# Patient Record
Sex: FEMALE | Race: WHITE | NOT HISPANIC OR LATINO | Employment: OTHER | URBAN - METROPOLITAN AREA
[De-identification: names, ages, dates, MRNs, and addresses within clinical notes are randomized per-mention and may not be internally consistent; named-entity substitution may affect disease eponyms.]

---

## 2017-01-04 ENCOUNTER — HOSPITAL ENCOUNTER (OUTPATIENT)
Dept: RADIOLOGY | Facility: HOSPITAL | Age: 72
Discharge: HOME | End: 2017-01-04
Attending: FAMILY MEDICINE
Payer: MEDICARE

## 2017-01-04 DIAGNOSIS — Z12.31 VISIT FOR SCREENING MAMMOGRAM: ICD-10-CM

## 2017-01-04 PROCEDURE — 77067 SCR MAMMO BI INCL CAD: CPT | Mod: TC

## 2017-01-04 PROCEDURE — 77067 SCR MAMMO BI INCL CAD: CPT | Mod: 26,,, | Performed by: RADIOLOGY

## 2017-01-04 PROCEDURE — 77063 BREAST TOMOSYNTHESIS BI: CPT | Mod: 26,,, | Performed by: RADIOLOGY

## 2018-05-11 DIAGNOSIS — Z12.31 VISIT FOR SCREENING MAMMOGRAM: Primary | ICD-10-CM

## 2018-05-18 ENCOUNTER — HOSPITAL ENCOUNTER (OUTPATIENT)
Dept: RADIOLOGY | Facility: HOSPITAL | Age: 73
Discharge: HOME | End: 2018-05-18
Attending: NURSE PRACTITIONER
Payer: MEDICARE

## 2018-05-18 DIAGNOSIS — Z12.31 VISIT FOR SCREENING MAMMOGRAM: ICD-10-CM

## 2018-05-18 PROCEDURE — 77067 SCR MAMMO BI INCL CAD: CPT | Mod: TC

## 2018-05-18 PROCEDURE — 77067 SCR MAMMO BI INCL CAD: CPT | Mod: 26,,, | Performed by: RADIOLOGY

## 2018-05-18 PROCEDURE — 77063 BREAST TOMOSYNTHESIS BI: CPT | Mod: 26,,, | Performed by: RADIOLOGY

## 2018-05-23 DIAGNOSIS — Z12.31 VISIT FOR SCREENING MAMMOGRAM: Primary | ICD-10-CM

## 2019-02-26 ENCOUNTER — HOSPITAL ENCOUNTER (OUTPATIENT)
Facility: HOSPITAL | Age: 74
Discharge: HOME | End: 2019-03-01
Attending: EMERGENCY MEDICINE | Admitting: HOSPITALIST
Payer: MEDICARE

## 2019-02-26 DIAGNOSIS — K92.2 GI BLEED: Primary | ICD-10-CM

## 2019-02-26 DIAGNOSIS — K62.5 BRBPR (BRIGHT RED BLOOD PER RECTUM): ICD-10-CM

## 2019-02-26 DIAGNOSIS — Z01.818 PREOP EXAMINATION: ICD-10-CM

## 2019-02-26 DIAGNOSIS — K92.2 GASTROINTESTINAL HEMORRHAGE, UNSPECIFIED GASTROINTESTINAL HEMORRHAGE TYPE: ICD-10-CM

## 2019-02-26 DIAGNOSIS — K31.89 DUODENAL MASS: ICD-10-CM

## 2019-02-26 DIAGNOSIS — R11.2 NAUSEA AND VOMITING, INTRACTABILITY OF VOMITING NOT SPECIFIED, UNSPECIFIED VOMITING TYPE: ICD-10-CM

## 2019-02-26 LAB
ABO + RH BLD: NORMAL
ALBUMIN SERPL BCP-MCNC: 3.5 G/DL
ALP SERPL-CCNC: 82 U/L
ALT SERPL W/O P-5'-P-CCNC: 22 U/L
ANION GAP SERPL CALC-SCNC: 10 MMOL/L
AST SERPL-CCNC: 20 U/L
BASOPHILS # BLD AUTO: 0.03 K/UL
BASOPHILS NFR BLD: 0.3 %
BILIRUB SERPL-MCNC: 0.6 MG/DL
BLD GP AB SCN CELLS X3 SERPL QL: NORMAL
BUN SERPL-MCNC: 32 MG/DL
CALCIUM SERPL-MCNC: 9.8 MG/DL
CHLORIDE SERPL-SCNC: 103 MMOL/L
CO2 SERPL-SCNC: 24 MMOL/L
CREAT SERPL-MCNC: 0.9 MG/DL
DIFFERENTIAL METHOD: ABNORMAL
EOSINOPHIL # BLD AUTO: 0.2 K/UL
EOSINOPHIL NFR BLD: 2.1 %
ERYTHROCYTE [DISTWIDTH] IN BLOOD BY AUTOMATED COUNT: 13.3 %
EST. GFR  (AFRICAN AMERICAN): >60 ML/MIN/1.73 M^2
EST. GFR  (NON AFRICAN AMERICAN): >60 ML/MIN/1.73 M^2
GLUCOSE SERPL-MCNC: 124 MG/DL
HCT VFR BLD AUTO: 39.3 %
HGB BLD-MCNC: 13.4 G/DL
INR PPP: 1
LIPASE SERPL-CCNC: 33 U/L
LYMPHOCYTES # BLD AUTO: 2.4 K/UL
LYMPHOCYTES NFR BLD: 21.5 %
MCH RBC QN AUTO: 31.6 PG
MCHC RBC AUTO-ENTMCNC: 34.1 G/DL
MCV RBC AUTO: 93 FL
MONOCYTES # BLD AUTO: 0.8 K/UL
MONOCYTES NFR BLD: 7.5 %
NEUTROPHILS # BLD AUTO: 7.5 K/UL
NEUTROPHILS NFR BLD: 68.6 %
OB PNL STL: POSITIVE
PLATELET # BLD AUTO: 202 K/UL
PMV BLD AUTO: 9.9 FL
POTASSIUM SERPL-SCNC: 4.2 MMOL/L
PROT SERPL-MCNC: 7.2 G/DL
PROTHROMBIN TIME: 10.7 SEC
RBC # BLD AUTO: 4.24 M/UL
SODIUM SERPL-SCNC: 137 MMOL/L
WBC # BLD AUTO: 11.01 K/UL

## 2019-02-26 PROCEDURE — 86901 BLOOD TYPING SEROLOGIC RH(D): CPT

## 2019-02-26 PROCEDURE — 63600175 PHARM REV CODE 636 W HCPCS: Performed by: EMERGENCY MEDICINE

## 2019-02-26 PROCEDURE — 85025 COMPLETE CBC W/AUTO DIFF WBC: CPT

## 2019-02-26 PROCEDURE — 82272 OCCULT BLD FECES 1-3 TESTS: CPT

## 2019-02-26 PROCEDURE — C9113 INJ PANTOPRAZOLE SODIUM, VIA: HCPCS | Performed by: EMERGENCY MEDICINE

## 2019-02-26 PROCEDURE — 96374 THER/PROPH/DIAG INJ IV PUSH: CPT

## 2019-02-26 PROCEDURE — 85610 PROTHROMBIN TIME: CPT

## 2019-02-26 PROCEDURE — 83690 ASSAY OF LIPASE: CPT

## 2019-02-26 PROCEDURE — 25000003 PHARM REV CODE 250: Performed by: EMERGENCY MEDICINE

## 2019-02-26 PROCEDURE — 80053 COMPREHEN METABOLIC PANEL: CPT

## 2019-02-26 PROCEDURE — 99291 CRITICAL CARE FIRST HOUR: CPT | Mod: 25

## 2019-02-26 RX ORDER — GABAPENTIN 100 MG/1
100 CAPSULE ORAL 3 TIMES DAILY
COMMUNITY

## 2019-02-26 RX ORDER — PANTOPRAZOLE SODIUM 40 MG/10ML
80 INJECTION, POWDER, LYOPHILIZED, FOR SOLUTION INTRAVENOUS
Status: COMPLETED | OUTPATIENT
Start: 2019-02-26 | End: 2019-02-26

## 2019-02-26 RX ORDER — CALCIUM CARBONATE 600 MG
600 TABLET ORAL ONCE
Status: ON HOLD | COMMUNITY
End: 2019-03-01 | Stop reason: HOSPADM

## 2019-02-26 RX ADMIN — SODIUM CHLORIDE 1000 ML: 0.9 INJECTION, SOLUTION INTRAVENOUS at 10:02

## 2019-02-26 RX ADMIN — PANTOPRAZOLE SODIUM 80 MG: 40 INJECTION, POWDER, FOR SOLUTION INTRAVENOUS at 10:02

## 2019-02-27 ENCOUNTER — ANESTHESIA EVENT (OUTPATIENT)
Dept: ENDOSCOPY | Facility: HOSPITAL | Age: 74
End: 2019-02-27
Payer: MEDICARE

## 2019-02-27 ENCOUNTER — ANESTHESIA (OUTPATIENT)
Dept: ENDOSCOPY | Facility: HOSPITAL | Age: 74
End: 2019-02-27
Payer: MEDICARE

## 2019-02-27 ENCOUNTER — CLINICAL SUPPORT (OUTPATIENT)
Dept: SMOKING CESSATION | Facility: CLINIC | Age: 74
End: 2019-02-27
Payer: COMMERCIAL

## 2019-02-27 DIAGNOSIS — F17.210 CIGARETTE SMOKER: Primary | ICD-10-CM

## 2019-02-27 PROBLEM — J44.9 COPD (CHRONIC OBSTRUCTIVE PULMONARY DISEASE): Status: ACTIVE | Noted: 2019-02-27

## 2019-02-27 PROBLEM — I10 HYPERTENSION: Status: ACTIVE | Noted: 2019-02-27

## 2019-02-27 PROBLEM — B19.20 HEPATITIS C: Status: ACTIVE | Noted: 2019-02-27

## 2019-02-27 PROBLEM — Z72.0 TOBACCO ABUSE: Status: ACTIVE | Noted: 2019-02-27

## 2019-02-27 PROBLEM — E78.5 HYPERLIPIDEMIA: Status: ACTIVE | Noted: 2019-02-27

## 2019-02-27 PROBLEM — K92.2 GI BLEED: Status: ACTIVE | Noted: 2019-02-27

## 2019-02-27 LAB
ANION GAP SERPL CALC-SCNC: 7 MMOL/L
BILIRUB UR QL STRIP: NEGATIVE
BUN SERPL-MCNC: 24 MG/DL
CALCIUM SERPL-MCNC: 8.7 MG/DL
CHLORIDE SERPL-SCNC: 107 MMOL/L
CLARITY UR: CLEAR
CO2 SERPL-SCNC: 24 MMOL/L
COLOR UR: YELLOW
CREAT SERPL-MCNC: 0.8 MG/DL
EST. GFR  (AFRICAN AMERICAN): >60 ML/MIN/1.73 M^2
EST. GFR  (NON AFRICAN AMERICAN): >60 ML/MIN/1.73 M^2
GLUCOSE SERPL-MCNC: 97 MG/DL
GLUCOSE UR QL STRIP: NEGATIVE
HCT VFR BLD AUTO: 33.1 %
HCT VFR BLD AUTO: 34.4 %
HGB BLD-MCNC: 11.6 G/DL
HGB BLD-MCNC: 11.6 G/DL
HGB UR QL STRIP: NEGATIVE
KETONES UR QL STRIP: NEGATIVE
LEUKOCYTE ESTERASE UR QL STRIP: NEGATIVE
NITRITE UR QL STRIP: NEGATIVE
PH UR STRIP: 6 [PH] (ref 5–8)
POTASSIUM SERPL-SCNC: 3.8 MMOL/L
PROT UR QL STRIP: NEGATIVE
SODIUM SERPL-SCNC: 138 MMOL/L
SP GR UR STRIP: 1.01 (ref 1–1.03)
URN SPEC COLLECT METH UR: NORMAL
UROBILINOGEN UR STRIP-ACNC: NEGATIVE EU/DL

## 2019-02-27 PROCEDURE — 25000003 PHARM REV CODE 250: Performed by: NURSE ANESTHETIST, CERTIFIED REGISTERED

## 2019-02-27 PROCEDURE — 87522 HEPATITIS C REVRS TRNSCRPJ: CPT

## 2019-02-27 PROCEDURE — 43235 PR EGD, FLEX, DIAGNOSTIC: ICD-10-PCS | Mod: ,,, | Performed by: INTERNAL MEDICINE

## 2019-02-27 PROCEDURE — 37000008 HC ANESTHESIA 1ST 15 MINUTES: Performed by: INTERNAL MEDICINE

## 2019-02-27 PROCEDURE — 85018 HEMOGLOBIN: CPT | Mod: 91

## 2019-02-27 PROCEDURE — 63600175 PHARM REV CODE 636 W HCPCS: Performed by: NURSE PRACTITIONER

## 2019-02-27 PROCEDURE — 94761 N-INVAS EAR/PLS OXIMETRY MLT: CPT

## 2019-02-27 PROCEDURE — 80048 BASIC METABOLIC PNL TOTAL CA: CPT

## 2019-02-27 PROCEDURE — 36415 COLL VENOUS BLD VENIPUNCTURE: CPT

## 2019-02-27 PROCEDURE — 99407 PR TOBACCO USE CESSATION INTENSIVE >10 MINUTES: ICD-10-PCS | Mod: S$GLB,,,

## 2019-02-27 PROCEDURE — 99215 PR OFFICE/OUTPT VISIT, EST, LEVL V, 40-54 MIN: ICD-10-PCS | Mod: ,,, | Performed by: INTERNAL MEDICINE

## 2019-02-27 PROCEDURE — 99215 OFFICE O/P EST HI 40 MIN: CPT | Mod: ,,, | Performed by: INTERNAL MEDICINE

## 2019-02-27 PROCEDURE — 43235 EGD DIAGNOSTIC BRUSH WASH: CPT | Mod: ,,, | Performed by: INTERNAL MEDICINE

## 2019-02-27 PROCEDURE — 85014 HEMATOCRIT: CPT | Mod: 91

## 2019-02-27 PROCEDURE — 43235 EGD DIAGNOSTIC BRUSH WASH: CPT | Performed by: INTERNAL MEDICINE

## 2019-02-27 PROCEDURE — 96376 TX/PRO/DX INJ SAME DRUG ADON: CPT | Mod: 59

## 2019-02-27 PROCEDURE — 37000009 HC ANESTHESIA EA ADD 15 MINS: Performed by: INTERNAL MEDICINE

## 2019-02-27 PROCEDURE — G0378 HOSPITAL OBSERVATION PER HR: HCPCS

## 2019-02-27 PROCEDURE — C9113 INJ PANTOPRAZOLE SODIUM, VIA: HCPCS | Performed by: NURSE PRACTITIONER

## 2019-02-27 PROCEDURE — 99407 BEHAV CHNG SMOKING > 10 MIN: CPT | Mod: S$GLB,,,

## 2019-02-27 PROCEDURE — 81003 URINALYSIS AUTO W/O SCOPE: CPT

## 2019-02-27 PROCEDURE — 63600175 PHARM REV CODE 636 W HCPCS: Performed by: NURSE ANESTHETIST, CERTIFIED REGISTERED

## 2019-02-27 RX ORDER — SODIUM CHLORIDE 0.9 % (FLUSH) 0.9 %
5 SYRINGE (ML) INJECTION
Status: DISCONTINUED | OUTPATIENT
Start: 2019-02-27 | End: 2019-03-01 | Stop reason: HOSPADM

## 2019-02-27 RX ORDER — ACETAMINOPHEN 325 MG/1
650 TABLET ORAL EVERY 4 HOURS PRN
Status: DISCONTINUED | OUTPATIENT
Start: 2019-02-27 | End: 2019-03-01 | Stop reason: HOSPADM

## 2019-02-27 RX ORDER — PROPOFOL 10 MG/ML
VIAL (ML) INTRAVENOUS
Status: DISCONTINUED | OUTPATIENT
Start: 2019-02-27 | End: 2019-02-27

## 2019-02-27 RX ORDER — ONDANSETRON 2 MG/ML
4 INJECTION INTRAMUSCULAR; INTRAVENOUS EVERY 8 HOURS PRN
Status: DISCONTINUED | OUTPATIENT
Start: 2019-02-27 | End: 2019-03-01 | Stop reason: HOSPADM

## 2019-02-27 RX ORDER — PANTOPRAZOLE SODIUM 40 MG/10ML
40 INJECTION, POWDER, LYOPHILIZED, FOR SOLUTION INTRAVENOUS 2 TIMES DAILY
Status: DISCONTINUED | OUTPATIENT
Start: 2019-02-27 | End: 2019-03-01 | Stop reason: HOSPADM

## 2019-02-27 RX ORDER — LIDOCAINE HCL/PF 100 MG/5ML
SYRINGE (ML) INTRAVENOUS
Status: DISCONTINUED | OUTPATIENT
Start: 2019-02-27 | End: 2019-02-27

## 2019-02-27 RX ORDER — SODIUM CHLORIDE 9 MG/ML
INJECTION, SOLUTION INTRAVENOUS CONTINUOUS PRN
Status: DISCONTINUED | OUTPATIENT
Start: 2019-02-27 | End: 2019-02-27

## 2019-02-27 RX ADMIN — PANTOPRAZOLE SODIUM 40 MG: 40 INJECTION, POWDER, LYOPHILIZED, FOR SOLUTION INTRAVENOUS at 10:02

## 2019-02-27 RX ADMIN — PROPOFOL 30 MG: 10 INJECTION, EMULSION INTRAVENOUS at 04:02

## 2019-02-27 RX ADMIN — LIDOCAINE HYDROCHLORIDE 100 MG: 20 INJECTION, SOLUTION INTRAVENOUS at 03:02

## 2019-02-27 RX ADMIN — PROPOFOL 50 MG: 10 INJECTION, EMULSION INTRAVENOUS at 03:02

## 2019-02-27 RX ADMIN — TOPICAL ANESTHETIC 1 EACH: 200 SPRAY DENTAL; PERIODONTAL at 03:02

## 2019-02-27 RX ADMIN — PROPOFOL 20 MG: 10 INJECTION, EMULSION INTRAVENOUS at 03:02

## 2019-02-27 RX ADMIN — SODIUM CHLORIDE: 9 INJECTION, SOLUTION INTRAVENOUS at 03:02

## 2019-02-27 RX ADMIN — PANTOPRAZOLE SODIUM 40 MG: 40 INJECTION, POWDER, LYOPHILIZED, FOR SOLUTION INTRAVENOUS at 08:02

## 2019-02-27 NOTE — HPI
This is a 73-year-old female with a history of chronic hepatitis-C, untreated, hypertension and hyperlipidemia presents complaining of dark hematochezia. She was in her usual state of health until yesterday when she noted several episodes, mild-to-moderate amount of dark red blood per rectum.  She denies any abdominal pain.  Some nausea. No hematemesis, dysphagia or odynophagia. She had a colonoscopy in 2016 which was unremarkable.  She does have a history of chronic hepatitis C, has not yet been treated.  She has never had an upper endoscopy that she is aware of.  She denies NSAID usage.  No other exacerbating or relieving factors or other associated symptoms.    The following portions of the patient's history were reviewed and updated as appropriate: allergies, current medications, past family history, past medical history, past social history, past surgical history and problem list.    (Portions of this note were dictated using voice recognition software and may contain dictation related errors in spelling/grammar/syntax not found on text review)

## 2019-02-27 NOTE — TRANSFER OF CARE
"Anesthesia Transfer of Care Note    Patient: Saima Dunn    Procedure(s) Performed: Procedure(s) (LRB):  ESOPHAGOGASTRODUODENOSCOPY (EGD) (N/A)    Patient location: GI    Anesthesia Type: MAC    Transport from OR: Transported from OR on room air with adequate spontaneous ventilation    Post pain: adequate analgesia    Post assessment: no apparent anesthetic complications and tolerated procedure well    Post vital signs: stable    Level of consciousness: sedated    Nausea/Vomiting: no nausea/vomiting    Complications: none    Transfer of care protocol was followed      Last vitals:   Visit Vitals  /67   Pulse 72   Temp 36.7 °C (98.1 °F)   Resp 18   Ht 4' 10" (1.473 m)   Wt 52 kg (114 lb 10.2 oz)   LMP 06/01/1966   SpO2 96%   Breastfeeding? No   BMI 23.96 kg/m²     "

## 2019-02-27 NOTE — PROGRESS NOTES
Individual Follow-Up Form    2/27/2019    Quit Date: To be determined    Clinical Status of Patient: Inpatient    Length of Service: 30 minutes    Comments: Smoking cessation education and materials provided.  Pt states that she is ready to quit smoking and she was enrolled in the [employer] during this visit.    Diagnosis: F17.210    Next Visit: Ambulatory referral to Smoking Cessation program following hospital discharge.

## 2019-02-27 NOTE — ED NOTES
SWATHI, ED TECH AT BEDSIDE PREPARING TO TRANSPORT PT TO FLOOR. RESPIRATIONS EVEN AND UNLABORED. NO ACUTE DISTRESS NOTED AT TIME OF TRANSPORT.

## 2019-02-27 NOTE — PROGRESS NOTES
[facility] Hospital Medicine  Progress Note    Patient Name: Saima Dunn  MRN: [id number]  Patient Class: OP- Observation   Admission Date: 2/26/2019  Length of Stay: 0 days  Attending Physician: Axel Cortez MD  Primary Care Provider: DALLAS Salvador        Subjective:     Principal Problem:GI bleed    HPI:  70 yo female with HTN, HLD, COPD, hepatitis C and tobacco abuse presented to ED with complaint of bright red blood per rectum followed by melena. Onset prior to arrival. The patient reports associated coffee ground emesis, generalized weakness and shortness of breath. She denies chest pain, palpitations and syncope. Labs in ED remarkable for H/H 13/39 , BUN 32. Occult blood positive. Pt hemodynamically stable. No active bleeding on exam. She reports last colonoscopy 1 year ago with no abnormalities, no recent upper GI studies. Patient admitted to  CDU for further evaluation. GI consulted.           Hospital Course:  Patient admitted for observation to the CDU. She was given protonix IV BID. Patient reported last bloody BM at 3 am with no additional episodes since. Hgb was trended and decreased to 11.6. GI was consulted and planned for EGD on 2/27/19.    Interval History: Pt examined at bedside. She reports one bloody BM around 3 am, no further episodes since. NAD or acute complaints. NPO for possible EGD today.    Review of Systems   Constitutional: Negative for chills, diaphoresis and fever.   HENT: Negative for congestion.    Respiratory: Negative for cough, chest tightness, shortness of breath and wheezing.    Cardiovascular: Negative for chest pain, palpitations and leg swelling.   Gastrointestinal: Positive for blood in stool. Negative for abdominal pain, diarrhea, nausea and vomiting.   Genitourinary: Negative for dysuria, flank pain, frequency and hematuria.   Musculoskeletal: Negative for back pain and myalgias.   Neurological: Positive for weakness. Negative  for dizziness, syncope, light-headedness and headaches.   Psychiatric/Behavioral: Negative for confusion.     Objective:     Vital Signs (Most Recent):  Temp: 97.9 °F (36.6 °C) (02/27/19 1706)  Pulse: 70 (02/27/19 1706)  Resp: 18 (02/27/19 1706)  BP: 137/62 (02/27/19 1706)  SpO2: 98 % (02/27/19 1706) Vital Signs (24h Range):  Temp:  [96.5 °F (35.8 °C)-98.3 °F (36.8 °C)] 97.9 °F (36.6 °C)  Pulse:  [] 70  Resp:  [16-24] 18  SpO2:  [94 %-100 %] 98 %  BP: ()/(51-68) 137/62     Weight: 52 kg (114 lb 10.2 oz)  Body mass index is 23.96 kg/m².    Intake/Output Summary (Last 24 hours) at 2/27/2019 1726  Last data filed at 2/27/2019 1608  Gross per 24 hour   Intake 100 ml   Output --   Net 100 ml      Physical Exam   Constitutional: She is oriented to person, place, and time. She appears well-developed and well-nourished.   Eyes: Conjunctivae are normal. Pupils are equal, round, and reactive to light.   Cardiovascular: Normal rate, regular rhythm, normal heart sounds and intact distal pulses.   Pulmonary/Chest: Effort normal. No respiratory distress. She has no wheezes.   Abdominal: Soft. Bowel sounds are normal. She exhibits no distension. There is no tenderness. There is no guarding.   Musculoskeletal: Normal range of motion. She exhibits no edema or tenderness.   Neurological: She is alert and oriented to person, place, and time. No cranial nerve deficit.   Skin: Skin is warm and dry. Capillary refill takes less than 2 seconds.   Psychiatric: She has a normal mood and affect. Her behavior is normal.   Nursing note and vitals reviewed.      Significant Labs:   CBC:   Recent Labs   Lab 02/26/19  2154 02/27/19  0502 02/27/19  0635   WBC 11.01  --   --    HGB 13.4 11.6* 11.6*   HCT 39.3 33.1* 34.4*     --   --      All pertinent labs within the past 24 hours have been reviewed.    Significant Imaging: I have reviewed all pertinent imaging results/findings within the past 24 hours.    Assessment/Plan:      *  GI bleed    -Patient reports last bloody BM at 3am  -NPO for possible EGD today, appreciate GI  -Hgb decreased to 11.6  -Continue to trend H&H  -Transfuse to keep hbg >7   -Continue PPI BID       Tobacco abuse    Encourage tobacco cessation        Hepatitis C    Appreciate GI, plan for outpatient treatment       Hyperlipidemia    Continue simvastatin 20 mg daily        Hypertension    SBP . Patient takes losartan at home    -Continue to hold for now       COPD (chronic obstructive pulmonary disease)    Chronic, stable   Prn nebs          VTE Risk Mitigation (From admission, onward)        Ordered     IP VTE HIGH RISK PATIENT  Once      02/27/19 0222     Place sequential compression device  Until discontinued      02/27/19 0222          Kelley Woodward PA-C  Department of Hospital Medicine   [facility]

## 2019-02-27 NOTE — PLAN OF CARE
02/27/19 1018   Discharge Assessment   Assessment Type Discharge Planning Assessment   Confirmed/corrected address and phone number on facesheet? Yes   Assessment information obtained from? Patient   Communicated expected length of stay with patient/caregiver yes   Prior to hospitilization cognitive status: Alert/Oriented   Prior to hospitalization functional status: Independent   Current cognitive status: Alert/Oriented   Current Functional Status: Independent   Lives With child(lisy), adult   Able to Return to Prior Arrangements yes   Is patient able to care for self after discharge? Yes   Patient currently being followed by outpatient case management? No   Patient currently receives any other outside agency services? No   Equipment Currently Used at Home none   Do you have any problems affording any of your prescribed medications? No   Is the patient taking medications as prescribed? yes   Does the patient have transportation home? Yes   Transportation Anticipated family or friend will provide   Does the patient receive services at the [facility]mad[facility] Clinic? No   Discharge Plan A Home   Discharge Plan B Home with family   DME Needed Upon Discharge  none   Patient/Family in Agreement with Plan yes       Discharge planning brochure provided. White board updated with CM name & contact info.  Pt encouraged to call with any questions or needs. CM will continue to follow patient throughout the transitions of care, and assist with any discharge needs.    Pt is [facility] pt and has already been in touch with PCP.  She will follow up in clinic tomorrow.    Dorcas Nunes RN    [phone]

## 2019-02-27 NOTE — SUBJECTIVE & OBJECTIVE
Past Medical History:   Diagnosis Date    Hepatitis C     Hyperlipidemia     Hypertension        Past Surgical History:   Procedure Laterality Date    COLONOSCOPY miralax split dose prep N/A 12/7/2016    [facility]formed by [facility]    HYSTERECTOMY  1967       Review of patient's allergies indicates:   Allergen Reactions    Codeine      Family History     Problem Relation (Age of Onset)    Ovarian cancer Mother, Sister        Tobacco Use    Smoking status: Current Every Day Smoker     Packs/day: 0.25    Tobacco comment: Enrolled in [employer]   Substance and Sexual Activity    Alcohol use: Not on file    Drug use: Not on file    Sexual activity: Not on file     Review of Systems  Objective:     Vital Signs (Most Recent):  Temp: 97.3 °F (36.3 °C) (02/27/19 0730)  Pulse: 69 (02/27/19 0822)  Resp: 18 (02/27/19 0730)  BP: 102/62 (02/27/19 0704)  SpO2: 95 % (02/27/19 0730) Vital Signs (24h Range):  Temp:  [96.5 °F (35.8 °C)-98.3 °F (36.8 °C)] 97.3 °F (36.3 °C)  Pulse:  [] 69  Resp:  [16-22] 18  SpO2:  [94 %-100 %] 95 %  BP: (102-144)/(52-68) 102/62     Weight: 52 kg (114 lb 10.2 oz) (02/27/19 0300)  Body mass index is 23.96 kg/m².    No intake or output data in the 24 hours ending 02/27/19 1122    Lines/Drains/Airways     Peripheral Intravenous Line                 Peripheral IV - Single Lumen 02/26/19 2152 Left Antecubital less than 1 day         Peripheral IV - Single Lumen 02/26/19 2235 Right Forearm less than 1 day                Physical Exam   Constitutional: She is oriented to person, place, and time. She appears well-developed and well-nourished. No distress.   HENT:   Head: Normocephalic and atraumatic.   Eyes: Conjunctivae are normal. No scleral icterus.   Neck: Normal range of motion. Neck supple. No tracheal deviation present. No thyromegaly present.   Cardiovascular: Normal rate and regular rhythm. Exam reveals no gallop and no friction rub.   No murmur  heard.  Pulmonary/Chest: Effort normal and breath sounds normal. No respiratory distress. She has no wheezes.   Abdominal: Soft. Bowel sounds are normal. She exhibits no distension. There is no tenderness.   Musculoskeletal:        Right wrist: She exhibits normal range of motion and no tenderness.        Left wrist: She exhibits normal range of motion and no tenderness.   Lymphadenopathy:        Head (right side): No submental and no submandibular adenopathy present.        Head (left side): No submental and no submandibular adenopathy present.   Neurological: She is alert and oriented to person, place, and time.   Skin: Skin is warm and dry. No rash noted. She is not diaphoretic. No erythema.   Psychiatric: She has a normal mood and affect. Her behavior is normal.   Nursing note and vitals reviewed.      Significant Labs:  CBC:   Recent Labs   Lab 02/26/19 2154 02/27/19  0502 02/27/19  0635   WBC 11.01  --   --    HGB 13.4 11.6* 11.6*   HCT 39.3 33.1* 34.4*     --   --      CMP:   Recent Labs   Lab 02/26/19 2154 02/27/19  0502   * 97   CALCIUM 9.8 8.7   ALBUMIN 3.5  --    PROT 7.2  --     138   K 4.2 3.8   CO2 24 24    107   BUN 32* 24*   CREATININE 0.9 0.8   ALKPHOS 82  --    ALT 22  --    AST 20  --    BILITOT 0.6  --        Significant Imaging:  Imaging results within the past 24 hours have been reviewed.

## 2019-02-27 NOTE — [employer] PATIENT INSTRUCTIONS
Discharge Summary/Instructions after an Endoscopic Procedure  Patient Name: Saima Dunn  Patient MRN: [id number]  Patient YOB: 1945  Wednesday, February 27, 2019  Magui Bradley MD  RESTRICTIONS:  During your procedure today, you received medications for sedation.  These   medications may affect your judgment, balance and coordination.  Therefore,   for 24 hours, you have the following restrictions:   - DO NOT drive a car, operate machinery, make legal/financial decisions,   sign important papers or drink alcohol.    ACTIVITY:  Today: no heavy lifting, straining or running due to procedural   sedation/anesthesia.  The following day: return to full activity including work.  DIET:  Eat and drink normally unless instructed otherwise.     TREATMENT FOR COMMON SIDE EFFECTS:  - Mild abdominal pain, nausea, belching, bloating or excessive gas:  rest,   eat lightly and use a heating pad.  - Sore Throat: treat with throat lozenges and/or gargle with warm salt   water.  - Because air was used during the procedure, expelling large amounts of air   from your rectum or belching is normal.  - If a bowel prep was taken, you may not have a bowel movement for 1-3 days.    This is normal.  SYMPTOMS TO WATCH FOR AND REPORT TO YOUR PHYSICIAN:  1. Abdominal pain or bloating, other than gas cramps.  2. Chest pain.  3. Back pain.  4. Signs of infection such as: chills or fever occurring within 24 hours   after the procedure.  5. Rectal bleeding, which would show as bright red, maroon, or black stools.   (A tablespoon of blood from the rectum is not serious, especially if   hemorrhoids are present.)  6. Vomiting.  7. Weakness or dizziness.  GO DIRECTLY TO THE NEAREST EMERGENCY ROOM IF YOU HAVE ANY OF THE FOLLOWING:      Difficulty breathing              Chills and/or fever over 101 F   Persistent vomiting and/or vomiting blood   Severe abdominal pain   Severe chest pain   Black, tarry stools   Bleeding- more than  one tablespoon   Any other symptom or condition that you feel may need urgent attention  Your doctor recommends these additional instructions:  If any biopsies were taken, your doctors clinic will contact you in 1 to 2   weeks with any results.  - [facility] to [facility].   - No NSAIDS.  - Clear liquid diet, do not advance.  NPO after MN.   - Continue present medications including bid IV PPI.  - EUS tomorrow to fully characterize this lesion.  For questions, problems or results please call your physician - Magui Bradley MD at Work:  [phone].  EMERGENCY PHONE NUMBER: [phone],  LAB RESULTS: [phone]  IF A COMPLICATION OR EMERGENCY SITUATION ARISES AND YOU ARE UNABLE TO REACH   YOUR PHYSICIAN - GO DIRECTLY TO THE EMERGENCY ROOM.  MD Magui Carty MD  2/27/2019 4:20:39 PM  This report has been verified and signed electronically.  [employer]

## 2019-02-27 NOTE — ANESTHESIA PREPROCEDURE EVALUATION
02/27/2019  Saima Dunn is a 73 y.o., female presents for EGD under MAC.    Past Medical History:   Diagnosis Date    Hepatitis C     Hyperlipidemia     Hypertension      Past Surgical History:   Procedure Laterality Date    COLONOSCOPY miralax split dose prep N/A 12/7/2016    Performed by Zhang Jorge Jr., MD at [facility] ENDO    HYSTERECTOMY  1967         Anesthesia Evaluation    I have reviewed the Patient Summary Reports.    I have reviewed the Nursing Notes.   I have reviewed the Medications.     Review of Systems  Anesthesia Hx:  No problems with previous Anesthesia    Cardiovascular:   Hypertension    Pulmonary:   COPD    Renal/:  Renal/ Normal     Hepatic/GI:   Liver Disease, Hepatitis    Neurological:  Neurology Normal    Endocrine:  Endocrine Normal        Physical Exam  General:  Well nourished    Airway/Jaw/Neck:  Airway Findings: Mouth Opening: Normal Tongue: Normal  General Airway Assessment: Adult  Mallampati: II  TM Distance: Normal, at least 6 cm       Chest/Lungs:  Chest/Lungs Findings: Clear to auscultation, Normal Respiratory Rate     Heart/Vascular:  Heart Findings: Rate: Normal  Rhythm: Regular Rhythm  Sounds: Normal     Abdomen:  Abdomen Findings:  Normal          Recent Labs   Lab 02/26/19 2154 02/27/19 0635   WBC 11.01  --   --    RBC 4.24  --   --    HGB 13.4   < > 11.6*   HCT 39.3   < > 34.4*     --   --    MCV 93  --   --    MCH 31.6*  --   --    MCHC 34.1  --   --     < > = values in this interval not displayed.       Chemistry        Component Value Date/Time     02/27/2019 0502    K 3.8 02/27/2019 0502     02/27/2019 0502    CO2 24 02/27/2019 0502    BUN 24 (H) 02/27/2019 0502    CREATININE 0.8 02/27/2019 0502    GLU 97 02/27/2019 0502        Component Value Date/Time    CALCIUM 8.7 02/27/2019 0502    ALKPHOS 82 02/26/2019 2154    AST 20  02/26/2019 2154    ALT 22 02/26/2019 2154    BILITOT 0.6 02/26/2019 2154    ESTGFRAFRICA >60 02/27/2019 0502    EGFRNONAA >60 02/27/2019 0502            Anesthesia Plan  Type of Anesthesia, risks & benefits discussed:  Anesthesia Type:  MAC  Patient's Preference:   Intra-op Monitoring Plan: standard ASA monitors  Intra-op Monitoring Plan Comments:   Post Op Pain Control Plan: per primary service following discharge from PACU  Post Op Pain Control Plan Comments:   Induction:   IV  Beta Blocker:         Informed Consent: Patient understands risks and agrees with Anesthesia plan.  Questions answered. Anesthesia consent signed with patient.  ASA Score: 3     Day of Surgery Review of History & Physical:  There are no significant changes.          Ready For Surgery From Anesthesia Perspective.

## 2019-02-27 NOTE — ASSESSMENT & PLAN NOTE
- given h/o hcv and prior negative colonoscopy, will plan egd  - continue npo  - PPI  - monitor hct

## 2019-02-27 NOTE — HOSPITAL COURSE
Patient was admitted for observation to the CDU. She was given protonix IV BID. Hgb was trended and remained stable above 11. 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 EUS was performed on 2/28/19 with biopsy taken of duodenal mass which resembled a possible lipoma on appearance. Patient reported a BM with BRB at 3 am on day of admission and a small BM with melena and minimal blood clots on morning of discharge. She remained asymptomatic and hemodynamically stable. Patient was discharged to home in good condition with pantoprazole 40 mg PO daily and GI follow-up in the next 2 weeks to discuss hcv treatment and follow-up pathologies.

## 2019-02-27 NOTE — CONSULTS
[facility]  Gastroenterology  Consult Note    Patient Name: Saima Dunn  MRN: [id number]  Admission Date: 2/26/2019  Hospital Length of Stay: 0 days  Code Status: Full Code   Attending Provider: Axel Cortez MD   Consulting Provider: Nilda Joshi MD  Primary Care Physician: DALLAS Salvador  Principal Problem:GI bleed    Inpatient consult to [facility]  Consult performed by: Nilda Joshi MD  Consult ordered by: Maria E Martinez NP  Reason for consult: GI Bleed        Subjective:     HPI:  This is a 73-year-old female with a history of chronic hepatitis-C, untreated, hypertension and hyperlipidemia presents complaining of dark hematochezia. She was in her usual state of health until yesterday when she noted several episodes, mild-to-moderate amount of dark red blood per rectum.  She denies any abdominal pain.  Some nausea. No hematemesis, dysphagia or odynophagia. She had a colonoscopy in 2016 which was unremarkable.  She does have a history of chronic hepatitis C, has not yet been treated.  She has never had an upper endoscopy that she is aware of.  She denies NSAID usage.  No other exacerbating or relieving factors or other associated symptoms.    The following portions of the patient's history were reviewed and updated as appropriate: allergies, current medications, past family history, past medical history, past social history, past surgical history and problem list.    (Portions of this note were dictated using voice recognition software and may contain dictation related errors in spelling/grammar/syntax not found on text review)    Past Medical History:   Diagnosis Date    Hepatitis C     Hyperlipidemia     Hypertension        Past Surgical History:   Procedure Laterality Date    COLONOSCOPY miralax split dose prep N/A 12/7/2016    Performed by [facility] Jr[facility] at [facility] ENDO    HYSTERECTOMY  1967       Review of patient's allergies  indicates:   Allergen Reactions    Codeine      Family History     Problem Relation (Age of Onset)    Ovarian cancer Mother, Sister        Tobacco Use    Smoking status: Current Every Day Smoker     Packs/day: 0.25    Tobacco comment: Enrolled in [employer]   Substance and Sexual Activity    Alcohol use: Not on file    Drug use: Not on file    Sexual activity: Not on file     Review of Systems  Objective:     Vital Signs (Most Recent):  Temp: 97.3 °F (36.3 °C) (02/27/19 0730)  Pulse: 69 (02/27/19 0822)  Resp: 18 (02/27/19 0730)  BP: 102/62 (02/27/19 0704)  SpO2: 95 % (02/27/19 0730) Vital Signs (24h Range):  Temp:  [96.5 °F (35.8 °C)-98.3 °F (36.8 °C)] 97.3 °F (36.3 °C)  Pulse:  [] 69  Resp:  [16-22] 18  SpO2:  [94 %-100 %] 95 %  BP: (102-144)/(52-68) 102/62     Weight: 52 kg (114 lb 10.2 oz) (02/27/19 0300)  Body mass index is 23.96 kg/m².    No intake or output data in the 24 hours ending 02/27/19 1122    Lines/Drains/Airways     Peripheral Intravenous Line                 Peripheral IV - Single Lumen 02/26/19 2152 Left Antecubital less than 1 day         Peripheral IV - Single Lumen 02/26/19 2235 Right Forearm less than 1 day                Physical Exam   Constitutional: She is oriented to person, place, and time. She appears well-developed and well-nourished. No distress.   HENT:   Head: Normocephalic and atraumatic.   Eyes: Conjunctivae are normal. No scleral icterus.   Neck: Normal range of motion. Neck supple. No tracheal deviation present. No thyromegaly present.   Cardiovascular: Normal rate and regular rhythm. Exam reveals no gallop and no friction rub.   No murmur heard.  Pulmonary/Chest: Effort normal and breath sounds normal. No respiratory distress. She has no wheezes.   Abdominal: Soft. Bowel sounds are normal. She exhibits no distension. There is no tenderness.   Musculoskeletal:        Right wrist: She exhibits normal range of motion and no tenderness.        Left wrist:  She exhibits normal range of motion and no tenderness.   Lymphadenopathy:        Head (right side): No submental and no submandibular adenopathy present.        Head (left side): No submental and no submandibular adenopathy present.   Neurological: She is alert and oriented to person, place, and time.   Skin: Skin is warm and dry. No rash noted. She is not diaphoretic. No erythema.   Psychiatric: She has a normal mood and affect. Her behavior is normal.   Nursing note and vitals reviewed.      Significant Labs:  CBC:   Recent Labs   Lab 02/26/19 2154 02/27/19  0502 02/27/19  0635   WBC 11.01  --   --    HGB 13.4 11.6* 11.6*   HCT 39.3 33.1* 34.4*     --   --      CMP:   Recent Labs   Lab 02/26/19 2154 02/27/19  0502   * 97   CALCIUM 9.8 8.7   ALBUMIN 3.5  --    PROT 7.2  --     138   K 4.2 3.8   CO2 24 24    107   BUN 32* 24*   CREATININE 0.9 0.8   ALKPHOS 82  --    ALT 22  --    AST 20  --    BILITOT 0.6  --        Significant Imaging:  Imaging results within the past 24 hours have been reviewed.    Assessment/Plan:     * GI bleed    - given h/o hcv and prior negative colonoscopy, will plan egd  - continue npo  - PPI  - monitor hct     Hepatitis C    - will send fibrosure, hcv genotype and pcr  - plan outpt treatment         Thank you for your consult. I will follow-up with patient. Please contact us if you have any additional questions.    Nilda Joshi MD  Gastroenterology  [facility]

## 2019-02-27 NOTE — PLAN OF CARE
02/27/19 1017   LEZAMA Message   Medicare Outpatient and Observation Notification regarding financial responsibility Given to patient/caregiver;Explained to patient/caregiver;Signed/date by patient/caregiver   Date LEZAMA was signed 02/27/19   Time LEZAMA was signed 1016

## 2019-02-27 NOTE — NURSING
Patient returned from Endoscopy on stretcher drinking a coke. AAOx4. No c/o pain nor discomfort noted. No co n/v. Ambulated to toilet with SBA. Ambulated to bed. Will cont to monitor.

## 2019-02-27 NOTE — H&P (VIEW-ONLY)
[facility]  Gastroenterology  Consult Note    Patient Name: Saima Dunn  MRN: [id number]  Admission Date: 2/26/2019  Hospital Length of Stay: 0 days  Code Status: Full Code   Attending Provider: Axel Cortez MD   Consulting Provider: Nilda Joshi MD  Primary Care Physician: DALLAS Salvador  Principal Problem:GI bleed    Inpatient consult [facility]  Consult performed by: Nilda Joshi MD  Consult ordered by: Maria E Martinez NP  Reason for consult: GI Bleed        Subjective:     HPI:  This is a 73-year-old female with a history of chronic hepatitis-C, untreated, hypertension and hyperlipidemia presents complaining of dark hematochezia. She was in her usual state of health until yesterday when she noted several episodes, mild-to-moderate amount of dark red blood per rectum.  She denies any abdominal pain.  Some nausea. No hematemesis, dysphagia or odynophagia. She had a colonoscopy in 2016 which was unremarkable.  She does have a history of chronic hepatitis C, has not yet been treated.  She has never had an upper endoscopy that she is aware of.  She denies NSAID usage.  No other exacerbating or relieving factors or other associated symptoms.    The following portions of the patient's history were reviewed and updated as appropriate: allergies, current medications, past family history, past medical history, past social history, past surgical history and problem list.    (Portions of this note were dictated using voice recognition software and may contain dictation related errors in spelling/grammar/syntax not found on text review)    Past Medical History:   Diagnosis Date    Hepatitis C     Hyperlipidemia     Hypertension        Past Surgical History:   Procedure Laterality Date    COLONOSCOPY miralax split dose prep N/A 12/7/2016    Performed by [facility] Jr., [facility] at [facility] ENDO    HYSTERECTOMY  1967       Review of patient's allergies  indicates:   Allergen Reactions    Codeine      Family History     Problem Relation (Age of Onset)    Ovarian cancer Mother, Sister        Tobacco Use    Smoking status: Current Every Day Smoker     Packs/day: 0.25    Tobacco comment: Enrolled in [employer]   Substance and Sexual Activity    Alcohol use: Not on file    Drug use: Not on file    Sexual activity: Not on file     Review of Systems  Objective:     Vital Signs (Most Recent):  Temp: 97.3 °F (36.3 °C) (02/27/19 0730)  Pulse: 69 (02/27/19 0822)  Resp: 18 (02/27/19 0730)  BP: 102/62 (02/27/19 0704)  SpO2: 95 % (02/27/19 0730) Vital Signs (24h Range):  Temp:  [96.5 °F (35.8 °C)-98.3 °F (36.8 °C)] 97.3 °F (36.3 °C)  Pulse:  [] 69  Resp:  [16-22] 18  SpO2:  [94 %-100 %] 95 %  BP: (102-144)/(52-68) 102/62     Weight: 52 kg (114 lb 10.2 oz) (02/27/19 0300)  Body mass index is 23.96 kg/m².    No intake or output data in the 24 hours ending 02/27/19 1122    Lines/Drains/Airways     Peripheral Intravenous Line                 Peripheral IV - Single Lumen 02/26/19 2152 Left Antecubital less than 1 day         Peripheral IV - Single Lumen 02/26/19 2235 Right Forearm less than 1 day                Physical Exam   Constitutional: She is oriented to person, place, and time. She appears well-developed and well-nourished. No distress.   HENT:   Head: Normocephalic and atraumatic.   Eyes: Conjunctivae are normal. No scleral icterus.   Neck: Normal range of motion. Neck supple. No tracheal deviation present. No thyromegaly present.   Cardiovascular: Normal rate and regular rhythm. Exam reveals no gallop and no friction rub.   No murmur heard.  Pulmonary/Chest: Effort normal and breath sounds normal. No respiratory distress. She has no wheezes.   Abdominal: Soft. Bowel sounds are normal. She exhibits no distension. There is no tenderness.   Musculoskeletal:        Right wrist: She exhibits normal range of motion and no tenderness.        Left wrist:  She exhibits normal range of motion and no tenderness.   Lymphadenopathy:        Head (right side): No submental and no submandibular adenopathy present.        Head (left side): No submental and no submandibular adenopathy present.   Neurological: She is alert and oriented to person, place, and time.   Skin: Skin is warm and dry. No rash noted. She is not diaphoretic. No erythema.   Psychiatric: She has a normal mood and affect. Her behavior is normal.   Nursing note and vitals reviewed.      Significant Labs:  CBC:   Recent Labs   Lab 02/26/19 2154 02/27/19  0502 02/27/19  0635   WBC 11.01  --   --    HGB 13.4 11.6* 11.6*   HCT 39.3 33.1* 34.4*     --   --      CMP:   Recent Labs   Lab 02/26/19 2154 02/27/19  0502   * 97   CALCIUM 9.8 8.7   ALBUMIN 3.5  --    PROT 7.2  --     138   K 4.2 3.8   CO2 24 24    107   BUN 32* 24*   CREATININE 0.9 0.8   ALKPHOS 82  --    ALT 22  --    AST 20  --    BILITOT 0.6  --        Significant Imaging:  Imaging results within the past 24 hours have been reviewed.    Assessment/Plan:     * GI bleed    - given h/o hcv and prior negative colonoscopy, will plan egd  - continue npo  - PPI  - monitor hct     Hepatitis C    - will send fibrosure, hcv genotype and pcr  - plan outpt treatment         Thank you for your consult. I will follow-up with patient. Please contact us if you have any additional questions.    Nilda Joshi MD  Gastroenterology  [facility]

## 2019-02-27 NOTE — PLAN OF CARE
Report called to lakshmi on 4th floor   vss no acute distress noted.   No complaints of pain or nausea

## 2019-02-27 NOTE — ASSESSMENT & PLAN NOTE
No active bleeding on exam   Trend H/H q6h   Type and screen, transfuse to keep Hbg >7   Consult GI   PPI BID

## 2019-02-27 NOTE — ANESTHESIA POSTPROCEDURE EVALUATION
"Anesthesia Post Evaluation    Patient: Saima Dunn    Procedure(s) Performed: Procedure(s) (LRB):  ESOPHAGOGASTRODUODENOSCOPY (EGD) (N/A)    Final Anesthesia Type: MAC  Patient location during evaluation: GI PACU  Patient participation: Yes- Able to Participate  Level of consciousness: awake and alert and oriented  Post-procedure vital signs: reviewed and stable  Pain management: adequate  Airway patency: patent  PONV status at discharge: No PONV  Anesthetic complications: no      Cardiovascular status: stable, hemodynamically stable and blood pressure returned to baseline  Respiratory status: room air, unassisted and spontaneous ventilation  Hydration status: euvolemic  Follow-up not needed.        Visit Vitals  /67   Pulse 72   Temp 36.7 °C (98.1 °F)   Resp 18   Ht 4' 10" (1.473 m)   Wt 52 kg (114 lb 10.2 oz)   LMP 06/01/1966   SpO2 96%   Breastfeeding? No   BMI 23.96 kg/m²       Pain/Anjum Score: No Data Recorded      "

## 2019-02-27 NOTE — ED PROVIDER NOTES
Encounter Date: 2/26/2019    SCRIBE #1 NOTE: I, Arnold Burks, am scribing for, and in the presence of,  Dr. Fonseca. I have scribed the entire note.       History     Chief Complaint   Patient presents with    Rectal Bleeding     73y F ambulatory to ED with c/o bloody stools and coffee ground emesis x1 day, c/o weakness     Saima Dunn is a 73 y.o. female who  has a past medical history of Hepatitis C, Hyperlipidemia, and Hypertension.    The patient presents to the ED due to rectal bleeding.   Patient reports symptoms started with one episode of coffee ground emesis this afternoon around 16:00. She then had an episode of dark, chocolate-colored bloody stool about 30 minutes afterward. Since that time she has had multiple episodes of eliana blood per rectum. She reports associated weakness, dizziness, and fatigue. She denies any associated abdominal pain, flank pain, or urinary complaints. She denies chest pain, SOB, fever, or any recent illness. She denies any prior similar episodes. She reports an unremarkable colonoscopy within the last few years.      The history is provided by the patient.     Review of patient's allergies indicates:   Allergen Reactions    Codeine      Past Medical History:   Diagnosis Date    Hepatitis C     Hyperlipidemia     Hypertension      Past Surgical History:   Procedure Laterality Date    COLONOSCOPY miralax split dose prep N/A 12/7/2016    Performed by [facility] Jr., MD at [facility] ENDO    HYSTERECTOMY  1967     Family History   Problem Relation Age of Onset    Ovarian cancer Mother     Ovarian cancer Sister      Social History     Tobacco Use    Smoking status: Current Every Day Smoker     Packs/day: 0.25   Substance Use Topics    Alcohol use: Not on file    Drug use: Not on file     Review of Systems   Constitutional: Negative for fever.   HENT: Negative for facial swelling and sore throat.    Eyes: Negative for pain and redness.   Respiratory: Negative  for shortness of breath.    Cardiovascular: Negative for chest pain.   Gastrointestinal: Positive for anal bleeding, blood in stool and vomiting. Negative for abdominal distention, abdominal pain, diarrhea and nausea.   Genitourinary: Negative for dysuria and hematuria.   Musculoskeletal: Negative for back pain and neck pain.   Skin: Negative for rash.   Neurological: Positive for weakness. Negative for seizures and facial asymmetry.       Physical Exam     Initial Vitals [02/26/19 2122]   BP Pulse Resp Temp SpO2   130/66 99 16 98.3 °F (36.8 °C) 99 %      MAP       --         Physical Exam    Nursing note and vitals reviewed.  Constitutional: She appears well-developed and well-nourished. She is not diaphoretic. No distress.   Well-appearing, NAD.   HENT:   Head: Normocephalic and atraumatic.   Mouth/Throat: Oropharynx is clear and moist.   Eyes: Conjunctivae and EOM are normal. Pupils are equal, round, and reactive to light.   Neck: Normal range of motion. Neck supple. No tracheal deviation present.   Cardiovascular: Normal rate, regular rhythm, normal heart sounds and intact distal pulses.   Pulmonary/Chest: Breath sounds normal. No stridor. No respiratory distress. She has no wheezes.   Abdominal: Soft. Bowel sounds are normal. She exhibits no distension and no mass. There is no tenderness. There is no rigidity, no rebound, no guarding, no CVA tenderness, no tenderness at McBurney's point and negative Rios's sign.   No significant tenderness.   Musculoskeletal: Normal range of motion. She exhibits no edema or tenderness.   Neurological: She is alert and oriented to person, place, and time. She has normal strength. No cranial nerve deficit or sensory deficit.   Skin: Skin is warm and dry. Capillary refill takes less than 2 seconds. No pallor.   Psychiatric: She has a normal mood and affect. Her behavior is normal. Thought content normal.         ED Course   Procedures  Labs Reviewed   CBC W/ AUTO DIFFERENTIAL -  Abnormal; Notable for the following components:       Result Value    MCH 31.6 (*)     All other components within normal limits   COMPREHENSIVE METABOLIC PANEL - Abnormal; Notable for the following components:    Glucose 124 (*)     BUN, Bld 32 (*)     All other components within normal limits   OCCULT BLOOD X 1, STOOL - Abnormal; Notable for the following components:    Occult Blood Positive (*)     All other components within normal limits   PROTIME-INR   LIPASE   TYPE & SCREEN          Imaging Results    None          Medical Decision Making:   History:   Old Medical Records: I decided to obtain old medical records.  Old Records Summarized: other records.       <> Summary of Records: Patient had colonscopy 12/2016 due to heme-positive stool. Patient has not followed up since that time.  Initial Assessment:   74 y/o female presents with coffee ground emesis and dark bloody stool starting today. Denies prior Hx.   Will obtain labs, stool guaiac, and anticipate admission for close monitoring for possible upper GI bleed.  Differential Diagnosis:   Differential Diagnosis includes, but is not limited to:  Lower GI bleed (AVM, colitis, colon cancer, polyp, internal/external hemorrhoid, IBS, rectal injury/foreign body, chronic diarrhea, anal fissure), upper GI bleed (PUD, perforated ulcer, esophageal variceal bleed), Crohns disease, ulcerative colitis, chronic diarrhea, dietary intake    ED Management:  Patient with multiple episodes of eliana blood per rectum, guaiac +.  Given Protonix in ED.  Labs unremarkable, H/H stable, no indication for emergent transfusion at this time.   However, given multiple recurrent large-volume episodes of rectal bleeding, will request admission for monitoring and further management.   Upon re-evaluation, the patient's status has remained stable.    At this time, I believe the patient should be admitted to the hospital for further evaluation and management of rectal bleeding.  [facility]  service was contacted and the case was discussed. Additional recommendations at this time: none    The consulting physician/team agrees with plan and will admit under their service.   The patient and family were updated with test results, overall impression, and further plan of care. All questions were answered. The patient expressed understanding and agrees with the current plan.                Attending Attestation:         Attending Critical Care:   Critical Care Times:   Direct Patient Care (initial evaluation, reassessments, and time considering the case)................................................................5 minutes.   Additional History from reviewing old medical records or taking additional history from the family, EMS, PCP, etc.......................5 minutes.   Ordering, Reviewing, and Interpreting Diagnostic Studies...............................................................................................................5 minutes.   Documentation..................................................................................................................................................................................5 minutes.   Consultation with other Physicians. .................................................................................................................................................5 minutes.   Consultation with the patient's family directly relating to the patient's condition, care, and DNR status (when patient unable)......5 minutes.   Other..................................................................................................................................................................................................5 minutes.   ==============================================================  · Total Critical Care Time - exclusive of procedural time: 35 minutes.  ==============================================================  Critical care was  necessary to treat or prevent imminent or life-threatening deterioration of the following conditions: GI bleeding.                  Clinical Impression:     1. BRBPR (bright red blood per rectum)    2. GI bleed    3. Nausea and vomiting, intractability of vomiting not specified, unspecified vomiting type              I, Dr. Garrison Fonseca, personally performed the services described in this documentation. All medical record entries made by the scribe were at my direction and in my presence.  I have reviewed the chart and agree that the record reflects my personal performance and is accurate and complete.     Garrison Fonseca MD.             Garrison Fonseca MD  02/27/19 0882

## 2019-02-27 NOTE — INTERVAL H&P NOTE
The patient has been examined and the H&P has been reviewed:    I concur with the findings and no changes have occurred since H&P was written.    Anesthesia/Surgery risks, benefits and alternative options discussed and understood by patient/family.          Active Hospital Problems    Diagnosis  POA    *GI bleed [K92.2]  Yes    COPD (chronic obstructive pulmonary disease) [J44.9]  Yes    Hypertension [I10]  Yes    Hyperlipidemia [E78.5]  Yes    Hepatitis C [B19.20]  Yes    Tobacco abuse [Z72.0]  Yes      Resolved Hospital Problems   No resolved problems to display.

## 2019-02-27 NOTE — PLAN OF CARE
[facility]. NPO since MN.  Patent IV access.  L AC and R AC. Procedure will be done later this afternoon.

## 2019-02-27 NOTE — HPI
70 yo female with HTN, HLD, COPD, hepatitis C and tobacco abuse presented to ED with complaint of bright red blood per rectum followed by melena. Onset prior to arrival. The patient reports associated coffee ground emesis, generalized weakness and shortness of breath. She denies chest pain, palpitations and syncope. Labs in ED remarkable for H/H 13/39 , BUN 32. Occult blood positive. Pt hemodynamically stable. No active bleeding on exam. She reports last colonoscopy 1 year ago with no abnormalities, no recent upper GI studies. Patient admitted to  CDU for further evaluation. GI consulted.

## 2019-02-27 NOTE — SUBJECTIVE & OBJECTIVE
Interval History: Pt examined at bedside. She reports one bloody BM around 3 am, no further episodes since. NAD or acute complaints. NPO for possible EGD today.    Review of Systems   Constitutional: Negative for chills, diaphoresis and fever.   HENT: Negative for congestion.    Respiratory: Negative for cough, chest tightness, shortness of breath and wheezing.    Cardiovascular: Negative for chest pain, palpitations and leg swelling.   Gastrointestinal: Positive for blood in stool. Negative for abdominal pain, diarrhea, nausea and vomiting.   Genitourinary: Negative for dysuria, flank pain, frequency and hematuria.   Musculoskeletal: Negative for back pain and myalgias.   Neurological: Positive for weakness. Negative for dizziness, syncope, light-headedness and headaches.   Psychiatric/Behavioral: Negative for confusion.     Objective:     Vital Signs (Most Recent):  Temp: 97.9 °F (36.6 °C) (02/27/19 1706)  Pulse: 70 (02/27/19 1706)  Resp: 18 (02/27/19 1706)  BP: 137/62 (02/27/19 1706)  SpO2: 98 % (02/27/19 1706) Vital Signs (24h Range):  Temp:  [96.5 °F (35.8 °C)-98.3 °F (36.8 °C)] 97.9 °F (36.6 °C)  Pulse:  [] 70  Resp:  [16-24] 18  SpO2:  [94 %-100 %] 98 %  BP: ()/(51-68) 137/62     Weight: 52 kg (114 lb 10.2 oz)  Body mass index is 23.96 kg/m².    Intake/Output Summary (Last 24 hours) at 2/27/2019 1726  Last data filed at 2/27/2019 1608  Gross per 24 hour   Intake 100 ml   Output --   Net 100 ml      Physical Exam   Constitutional: She is oriented to person, place, and time. She appears well-developed and well-nourished.   Eyes: Conjunctivae are normal. Pupils are equal, round, and reactive to light.   Cardiovascular: Normal rate, regular rhythm, normal heart sounds and intact distal pulses.   Pulmonary/Chest: Effort normal. No respiratory distress. She has no wheezes.   Abdominal: Soft. Bowel sounds are normal. She exhibits no distension. There is no tenderness. There is no guarding.    Musculoskeletal: Normal range of motion. She exhibits no edema or tenderness.   Neurological: She is alert and oriented to person, place, and time. No cranial nerve deficit.   Skin: Skin is warm and dry. Capillary refill takes less than 2 seconds.   Psychiatric: She has a normal mood and affect. Her behavior is normal.   Nursing note and vitals reviewed.      Significant Labs:   CBC:   Recent Labs   Lab 02/26/19  2154 02/27/19  0502 02/27/19  0635   WBC 11.01  --   --    HGB 13.4 11.6* 11.6*   HCT 39.3 33.1* 34.4*     --   --      All pertinent labs within the past 24 hours have been reviewed.    Significant Imaging: I have reviewed all pertinent imaging results/findings within the past 24 hours.

## 2019-02-27 NOTE — SUBJECTIVE & OBJECTIVE
Past Medical History:   Diagnosis Date    Hepatitis C     Hyperlipidemia     Hypertension        Past Surgical History:   Procedure Laterality Date    COLONOSCOPY miralax split dose prep N/A 12/7/2016    Performed by Zhang Jorge Jr., MD at [facility] ENDO    HYSTERECTOMY  1967       Review of patient's allergies indicates:   Allergen Reactions    Codeine        No current facility-administered medications on file prior to encounter.      Current Outpatient Medications on File Prior to Encounter   Medication Sig    BABY ASPIRIN ORAL Take 81 mg by mouth once daily.    calcium carbonate (OS-AMY) 500 mg calcium (1,250 mg) tablet Take 2 tablets by mouth once daily.     calcium carbonate (OS-AMY) 600 mg calcium (1,500 mg) Tab Take 600 mg by mouth once.    gabapentin (NEURONTIN) 100 MG capsule Take 100 mg by mouth 3 (three) times daily.    losartan (COZAAR) 50 MG tablet Take 100 mg by mouth once daily.     simvastatin (ZOCOR) 20 MG tablet Take 20 mg by mouth every other day.      Family History     Problem Relation (Age of Onset)    Ovarian cancer Mother, Sister        Tobacco Use    Smoking status: Current Every Day Smoker     Packs/day: 0.25   Substance and Sexual Activity    Alcohol use: Not on file    Drug use: Not on file    Sexual activity: Not on file     Review of Systems   Constitutional: Negative for chills, diaphoresis and fever.   HENT: Negative for congestion.    Eyes: Negative for photophobia.   Respiratory: Positive for shortness of breath. Negative for cough, chest tightness and wheezing.    Cardiovascular: Negative for chest pain, palpitations and leg swelling.   Gastrointestinal: Positive for blood in stool, nausea and vomiting. Negative for abdominal pain and diarrhea.   Genitourinary: Negative for dysuria, flank pain, frequency and hematuria.   Musculoskeletal: Negative for back pain and myalgias.   Neurological: Positive for weakness. Negative for dizziness, syncope, light-headedness  and headaches.   Psychiatric/Behavioral: Negative for confusion.     Objective:     Vital Signs (Most Recent):  Temp: 98 °F (36.7 °C) (02/27/19 0300)  Pulse: 75 (02/27/19 0400)  Resp: 20 (02/27/19 0300)  BP: 131/65 (02/27/19 0300)  SpO2: (!) 94 % (02/27/19 0300) Vital Signs (24h Range):  Temp:  [98 °F (36.7 °C)-98.3 °F (36.8 °C)] 98 °F (36.7 °C)  Pulse:  [] 75  Resp:  [16-22] 20  SpO2:  [94 %-100 %] 94 %  BP: (102-144)/(52-68) 131/65     Weight: 52 kg (114 lb 10.2 oz)  Body mass index is 23.96 kg/m².    Physical Exam   Constitutional: She is oriented to person, place, and time. She appears well-developed and well-nourished.   HENT:   Head: Normocephalic and atraumatic.   Eyes: Conjunctivae are normal. Pupils are equal, round, and reactive to light.   Neck: Normal range of motion. No JVD present.   Cardiovascular: Normal rate, regular rhythm, normal heart sounds and intact distal pulses.   Pulmonary/Chest: Effort normal. No respiratory distress. She has no wheezes.   Abdominal: Soft. Bowel sounds are normal. She exhibits no distension. There is no tenderness. There is no guarding.   Musculoskeletal: Normal range of motion. She exhibits no edema or tenderness.   Neurological: She is alert and oriented to person, place, and time. No cranial nerve deficit.   Skin: Skin is warm and dry. Capillary refill takes less than 2 seconds.   Psychiatric: She has a normal mood and affect. Her behavior is normal.         CRANIAL NERVES     CN III, IV, VI   Pupils are equal, round, and reactive to light.       Significant Labs:   BMP:   Recent Labs   Lab 02/26/19 2154   *      K 4.2      CO2 24   BUN 32*   CREATININE 0.9   CALCIUM 9.8     CBC:   Recent Labs   Lab 02/26/19 2154   WBC 11.01   HGB 13.4   HCT 39.3        Significant Imaging: I have reviewed all pertinent imaging results/findings within the past 24 hours.

## 2019-02-27 NOTE — ASSESSMENT & PLAN NOTE
-Patient reports last bloody BM at 3am  -NPO for possible EGD today, appreciate GI  -Hgb decreased to 11.6  -Continue to trend H&H  -Transfuse to keep hbg >7   -Continue PPI BID

## 2019-02-27 NOTE — MEDICAL/APP STUDENT
"[facility] Medicine  Progress Note     Patient Name: Saima Dunn  MRN: [id number]  Admission Date: 2/26/2019  Hospital Length of Stay: 0 days  Code Status: Full Code   Admitting Physician: Axel Cortez MD   Primary Care Physician: Thiago Romero MD  Principal Problem:GI bleed    Subjective:    HPI: Ms Dunn is a 74 YO smoker (quarter pk/dy) with a history of COPD, HCV (Dx'ed Jan 2017, suspected as a complication of her pregnancy prior to HCV screening of blood products), controlled HTN, and HLD who presented to [facility] ED the evening of 2/27 with onset at 1600 following a nap of 1 day of hematochezia associated with 1 episode of possible hematemesis described as "coffee with cream-colored" of large volume (described as several cups), generalized weakness, diaphoresis, and dyspnea. Denies any history of these symptoms. Denies any abdominal pain, diarrhea, appetite changes, chest pain, palpitations, or pre/syncope. Notes prior colonoscopy for rectal bleeding in 2018, which was unremarkable (said she was told it was likely 2/2 hemorrhoids), and yearly mammograms that were normal.     Hospital course:  2/26: Presented to [facility] ED and evaluated. H&H low but stable at 13/39 with BUN 32 without creatinine elevation, FOBT positive, normal PT/INR. O/W BMP and liver panel unremarkable. Patient hemodynamically stable with no active bleeding noted on initial H&P. Given 1L NSL. Admitted to CDU for further evaluation. Given single dose Protonix 80 mg IV. Counsoled regarding smoking cessation, to which patient agrees.    2/27: Now on 40 mg IV Protonix BID. Patient seen by Dr. Joshi of Gastro team, who has scheduled patient for EGD this afternoon. Patient denies any further hematemesis or melenic episodes. Repeat H&H 11.6/34 today.    Interval History: NAEON. No further hematemesis or melenic episodes noted. Appetite stable, no new complaints.    Past Medical History:   Diagnosis " Date    Hepatitis C     Hyperlipidemia     Hypertension    Patient also states history of Mitral Valve Prolapse, untreated    Past Surgical History:   Procedure Laterality Date    COLONOSCOPY miralax split dose prep N/A 12/7/2016    Performed by [facility] Jr[facility] at [facility] ENDO    HYSTERECTOMY  1967       Family History   Problem Relation Age of Onset    Ovarian cancer Mother     Ovarian cancer Sister        Social History     Socioeconomic History    Marital status:      Spouse name: None    Number of children: None    Years of education: None    Highest education level: None   Social Needs    Financial resource strain: None    Food insecurity - worry: None    Food insecurity - inability: None    Transportation needs - medical: None    Transportation needs - non-medical: None   Occupational History    None   Tobacco Use    Smoking status: Current Every Day Smoker     Packs/day: 0.25    Tobacco comment: Enrolled in [employer]   Substance and Sexual Activity    Alcohol use: None    Drug use: None    Sexual activity: None   Other Topics Concern    None   Social History Narrative    None       Current Facility-Administered Medications   Medication Dose Route Frequency Provider Last Rate Last Dose    acetaminophen tablet 650 mg  650 mg Oral Q4H PRN Maria E Martinez NP        ondansetron injection 4 mg  4 mg Intravenous Q8H PRN Maria E Martinez NP        pantoprazole injection 40 mg  40 mg Intravenous BID Maria E Martinez NP   40 mg at 02/27/19 1031    sodium chloride 0.9% flush 5 mL  5 mL Intravenous PRN Maria E Martinez NP           Review of patient's allergies indicates:   Allergen Reactions    Codeine      Review of Systems   Constitutional: Negative for chills, diaphoresis, fever and malaise/fatigue.   Respiratory: Negative for cough, hemoptysis, sputum production and wheezing.    Cardiovascular: Negative for chest pain, palpitations and leg  swelling.   Gastrointestinal: Positive for blood in stool. Negative for heartburn, nausea and vomiting.        Positive for Hematemesis   Genitourinary: Negative for dysuria and hematuria.   Musculoskeletal: Negative for myalgias and neck pain.   Skin: Negative for itching and rash.   Neurological: Positive for weakness. Negative for dizziness and loss of consciousness.       Objective:    Vitals:    02/27/19 1135 02/27/19 1155 02/27/19 1200 02/27/19 1416   BP:   (!) 109/59    BP Location:   Left arm    Patient Position:   Lying    Pulse: 66  71    Resp:   18    Temp:   98 °F (36.7 °C)    TempSrc:   Oral    SpO2:  96% 95% 96%   Weight:       Height:           Physical Exam:  Constitutional: Well-nourished, pleasant A&O X 3 elderly female, lying in bed, NAD  HENT:   Head: Normocephalic and atraumatic.   Eyes: EOM are normal. No scleral icterus. No epistaxis.  Cardiovascular: Normal rate, normal S1 and S2 with midsystolic click appreciated at mitral area accentuated when held in inspiration maneuver.  Radial pulses 2+ bilaterally Carotid pulses 2+ bilaterally, Doralis pedis pulses 2+ bilaterally, Posterior tibial pulses 2+ bilaterally   Capillary refill < 2s  Pulmonary/Chest: No respiratory distress. Expiratory wheezes appreciated throughout. No crackles   Abdominal: Soft, Nontender, with no guarding, rebound tenderness, or masses   Musculoskeletal: Normal range of motion.   Neurological: She is alert and oriented to person, place, and time.   Skin: Skin is warm and dry.     Recent Lab Results       02/27/19  0638   02/27/19  0635   02/27/19  0502   02/26/19  2212   02/26/19  2154        Albumin         3.5     Alkaline Phosphatase         82     ALT         22     Anion Gap     7   10     Appearance, UA Clear             AST         20     Baso #         0.03     Basophil%         0.3     Bilirubin (UA) Negative             Total Bilirubin         0.6  Comment:  For infants and newborns, interpretation of results  should be based  on gestational age, weight and in agreement with clinical  observations.  Premature Infant recommended reference ranges:  Up to 24 hours.............<8.0 mg/dL  Up to 48 hours............<12.0 mg/dL  3-5 days..................<15.0 mg/dL  6-29 days.................<15.0 mg/dL       BUN, Bld     24   32     Calcium     8.7   9.8     Chloride     107   103     CO2     24   24     Color, UA Yellow             Creatinine     0.8   0.9     Differential Method         Automated     eGFR if      >60   >60     eGFR if non      >60  Comment:  Calculation used to obtain the estimated glomerular filtration  rate (eGFR) is the CKD-EPI equation.      >60  Comment:  Calculation used to obtain the estimated glomerular filtration  rate (eGFR) is the CKD-EPI equation.        Eos #         0.2     Eosinophil%         2.1     Glucose     97   124     Glucose, UA Negative             Gran # (ANC)         7.5     Gran%         68.6     Group & Rh         O POS     Hematocrit   34.4 33.1   39.3     Hemoglobin   11.6 11.6   13.4     INDIRECT TORY         NEG     Coumadin Monitoring INR         1.0  Comment:  Coumadin Therapy:  2.0 - 3.0 for INR for all indicators except mechanical heart valves  and antiphospholipid syndromes which should use 2.5 - 3.5.       Ketones, UA Negative             Leukocytes, UA Negative             Lipase         33     Lymph #         2.4     Lymph%         21.5     MCH         31.6     MCHC         34.1     MCV         93     Mono #         0.8     Mono%         7.5     MPV         9.9     Nitrite, UA Negative             Occult Blood       Positive       Occult Blood UA Negative             pH, UA 6.0             Platelets         202     Potassium     3.8   4.2     Total Protein         7.2     Protein, UA Negative  Comment:  Recommend a 24 hour urine protein or a urine   protein/creatinine ratio if globulin induced proteinuria is  clinically suspected.                Protime         10.7     RBC         4.24     RDW         13.3     Sodium     138   137     Specific [place], UA 1.010             Specimen UA Urine, Clean Catch             Urobilinogen, UA Negative             WBC         11.01                        No imaging done.  EGD to be performed today     Assessment & Plan:    Principal problem: GI Bleed  - H&H reduced today at 11.6/34 from 13/39, BUN slightly reduced at 24 down from 32.  - Typed & Screened, transfusion indicated if HB < 7  - Received 1L NSL in ED, BP currently stable at 100s/60s, pulses stable at high 60s/low 70s  - No further active bleeding noted, no acute events overnight.  - EGD this afternoon per GI, appreciate their consult    Chronic Hepatitis C:  - Chronic, patient says her PCP told her most likely route of infection was during her pregnancies (has 4 kids) as she required blood products (patient is 73, blood not screened prior to 1992), patient denies any IVDU.  - Will be referred and treated as OPT for antiviral therapy  - I have asked if patient has been tested for HAV or HBV before, which patient denied, may consider testing/vaccinating as outpatient if appropriate with patient's PCP.    Hyperlipidemia:  - On simvastatin 20 mg daily: continue    HTN:  - -144  - On Losartan 50 mg daily at home. Holding for this admission in light of concerns for GI bleed as above    COPD:  - Patient consulted regarding smoking cessation, to which this patient agrees  - Stable and chronic  - Patient says she is not SOB, expiratory wheezes noted throughout my exam consistent with COPD history  - PRN DuoNeb if required.    VTE Risk Mitigation (From admission, onward)        Ordered     IP VTE HIGH RISK PATIENT  Once      02/27/19 0222     Place sequential compression device  Until discontinued      02/27/19 0222          Thank you for allowing our team to provide care for this patient    Evangelist Paredes MSY3 - [facility] Medicine - Dr. Cortez  Attending

## 2019-02-27 NOTE — PLAN OF CARE
Problem: Adult Inpatient Plan of Care  Goal: Plan of Care Review  Outcome: Ongoing (interventions implemented as appropriate)  Plan of care reviewed with patient. Patient verbalized complete understanding. Fall precautions maintained this shift. Bed in lowest position, locked, call light within reach, and slip resistant socks on. Nurse instructed patient to notify staff for any assistance and patient verbalized complete understanding. Patient on telemetry monitor throughout the shift with no ectopy noted. Will cont to monitor.

## 2019-02-27 NOTE — H&P
[facility] Medicine  History & Physical    Patient Name: Saima Dunn  MRN: [id number]  Admission Date: 2/26/2019  Attending Physician: Axel Cortez MD   Primary Care Provider: Thiago Romero MD         Patient information was obtained from patient, past medical records and ER records.     Subjective:     Principal Problem:GI bleed    Chief Complaint:   Chief Complaint   Patient presents with    Rectal Bleeding     73y F ambulatory to ED with c/o bloody stools and coffee ground emesis x1 day, c/o weakness        HPI: 70 yo female with HTN, HLD, COPD, hepatitis C and tobacco abuse presented to ED with complaint of bright red blood per rectum followed by melena. Onset prior to arrival. The patient reports associated coffee ground emesis, generalized weakness and shortness of breath. She denies chest pain, palpitations and syncope. Labs in ED remarkable for H/H 13/39 , BUN 32. Occult blood positive. Pt hemodynamically stable. No active bleeding on exam. She reports last colonoscopy 1 year ago with no abnormalities, no recent upper GI studies. Patient admitted to  CDU for further evaluation. GI consulted.           Past Medical History:   Diagnosis Date    Hepatitis C     Hyperlipidemia     Hypertension        Past Surgical History:   Procedure Laterality Date    COLONOSCOPY miralax split dose prep N/A 12/7/2016    Performed by Zhang Jorge Jr., MD at [facility] ENDO    HYSTERECTOMY  1967       Review of patient's allergies indicates:   Allergen Reactions    Codeine        No current facility-administered medications on file prior to encounter.      Current Outpatient Medications on File Prior to Encounter   Medication Sig    BABY ASPIRIN ORAL Take 81 mg by mouth once daily.    calcium carbonate (OS-AMY) 500 mg calcium (1,250 mg) tablet Take 2 tablets by mouth once daily.     calcium carbonate (OS-AMY) 600 mg calcium (1,500 mg) Tab Take 600 mg by mouth once.     gabapentin (NEURONTIN) 100 MG capsule Take 100 mg by mouth 3 (three) times daily.    losartan (COZAAR) 50 MG tablet Take 100 mg by mouth once daily.     simvastatin (ZOCOR) 20 MG tablet Take 20 mg by mouth every other day.      Family History     Problem Relation (Age of Onset)    Ovarian cancer Mother, Sister        Tobacco Use    Smoking status: Current Every Day Smoker     Packs/day: 0.25   Substance and Sexual Activity    Alcohol use: Not on file    Drug use: Not on file    Sexual activity: Not on file     Review of Systems   Constitutional: Negative for chills, diaphoresis and fever.   HENT: Negative for congestion.    Eyes: Negative for photophobia.   Respiratory: Positive for shortness of breath. Negative for cough, chest tightness and wheezing.    Cardiovascular: Negative for chest pain, palpitations and leg swelling.   Gastrointestinal: Positive for blood in stool, nausea and vomiting. Negative for abdominal pain and diarrhea.   Genitourinary: Negative for dysuria, flank pain, frequency and hematuria.   Musculoskeletal: Negative for back pain and myalgias.   Neurological: Positive for weakness. Negative for dizziness, syncope, light-headedness and headaches.   Psychiatric/Behavioral: Negative for confusion.     Objective:     Vital Signs (Most Recent):  Temp: 98 °F (36.7 °C) (02/27/19 0300)  Pulse: 75 (02/27/19 0400)  Resp: 20 (02/27/19 0300)  BP: 131/65 (02/27/19 0300)  SpO2: (!) 94 % (02/27/19 0300) Vital Signs (24h Range):  Temp:  [98 °F (36.7 °C)-98.3 °F (36.8 °C)] 98 °F (36.7 °C)  Pulse:  [] 75  Resp:  [16-22] 20  SpO2:  [94 %-100 %] 94 %  BP: (102-144)/(52-68) 131/65     Weight: 52 kg (114 lb 10.2 oz)  Body mass index is 23.96 kg/m².    Physical Exam   Constitutional: She is oriented to person, place, and time. She appears well-developed and well-nourished.   HENT:   Head: Normocephalic and atraumatic.   Eyes: Conjunctivae are normal. Pupils are equal, round, and reactive to light.    Neck: Normal range of motion. No JVD present.   Cardiovascular: Normal rate, regular rhythm, normal heart sounds and intact distal pulses.   Pulmonary/Chest: Effort normal. No respiratory distress. She has no wheezes.   Abdominal: Soft. Bowel sounds are normal. She exhibits no distension. There is no tenderness. There is no guarding.   Musculoskeletal: Normal range of motion. She exhibits no edema or tenderness.   Neurological: She is alert and oriented to person, place, and time. No cranial nerve deficit.   Skin: Skin is warm and dry. Capillary refill takes less than 2 seconds.   Psychiatric: She has a normal mood and affect. Her behavior is normal.         CRANIAL NERVES     CN III, IV, VI   Pupils are equal, round, and reactive to light.       Significant Labs:   BMP:   Recent Labs   Lab 02/26/19 2154   *      K 4.2      CO2 24   BUN 32*   CREATININE 0.9   CALCIUM 9.8     CBC:   Recent Labs   Lab 02/26/19 2154   WBC 11.01   HGB 13.4   HCT 39.3        Significant Imaging: I have reviewed all pertinent imaging results/findings within the past 24 hours.    Assessment/Plan:     * GI bleed    No active bleeding on exam   Trend H/H q6h   Type and screen, transfuse to keep Hbg >7   Consult GI   PPI BID        Tobacco abuse    Encourage tobacco cessation        Hepatitis C    Chronic        Hyperlipidemia    Taking simvastatin 20 mg  Daily        Hypertension    -144   Taking Losartan-hold for now        COPD (chronic obstructive pulmonary disease)    Chronic, stable   Prn nebs          VTE Risk Mitigation (From admission, onward)        Ordered     IP VTE HIGH RISK PATIENT  Once      02/27/19 0222     Place sequential compression device  Until discontinued      02/27/19 0222             Maria E Martinez NP  Department of Hospital Medicine   [facility]

## 2019-02-28 ENCOUNTER — ANESTHESIA (OUTPATIENT)
Dept: ENDOSCOPY | Facility: HOSPITAL | Age: 74
End: 2019-02-28
Payer: MEDICARE

## 2019-02-28 LAB
ANION GAP SERPL CALC-SCNC: 3 MMOL/L
BASOPHILS # BLD AUTO: 0.02 K/UL
BASOPHILS NFR BLD: 0.3 %
BUN SERPL-MCNC: 13 MG/DL
CALCIUM SERPL-MCNC: 9 MG/DL
CHLORIDE SERPL-SCNC: 107 MMOL/L
CO2 SERPL-SCNC: 27 MMOL/L
CREAT SERPL-MCNC: 0.8 MG/DL
DIFFERENTIAL METHOD: ABNORMAL
EOSINOPHIL # BLD AUTO: 0.2 K/UL
EOSINOPHIL NFR BLD: 3.4 %
ERYTHROCYTE [DISTWIDTH] IN BLOOD BY AUTOMATED COUNT: 12.9 %
EST. GFR  (AFRICAN AMERICAN): >60 ML/MIN/1.73 M^2
EST. GFR  (NON AFRICAN AMERICAN): >60 ML/MIN/1.73 M^2
GLUCOSE SERPL-MCNC: 104 MG/DL
HCT VFR BLD AUTO: 32.9 %
HGB BLD-MCNC: 11.5 G/DL
LYMPHOCYTES # BLD AUTO: 1.9 K/UL
LYMPHOCYTES NFR BLD: 28.6 %
MCH RBC QN AUTO: 32.1 PG
MCHC RBC AUTO-ENTMCNC: 35 G/DL
MCV RBC AUTO: 92 FL
MONOCYTES # BLD AUTO: 0.5 K/UL
MONOCYTES NFR BLD: 8.4 %
NEUTROPHILS # BLD AUTO: 3.8 K/UL
NEUTROPHILS NFR BLD: 59 %
PLATELET # BLD AUTO: 155 K/UL
PMV BLD AUTO: 9.6 FL
POTASSIUM SERPL-SCNC: 3.9 MMOL/L
RBC # BLD AUTO: 3.58 M/UL
SODIUM SERPL-SCNC: 137 MMOL/L
WBC # BLD AUTO: 6.46 K/UL

## 2019-02-28 PROCEDURE — 27202059 HC NEEDLE, FNA (ANY): Performed by: INTERNAL MEDICINE

## 2019-02-28 PROCEDURE — 99204 OFFICE O/P NEW MOD 45 MIN: CPT | Mod: ,,, | Performed by: STUDENT IN AN ORGANIZED HEALTH CARE EDUCATION/TRAINING PROGRAM

## 2019-02-28 PROCEDURE — 37000009 HC ANESTHESIA EA ADD 15 MINS: Performed by: INTERNAL MEDICINE

## 2019-02-28 PROCEDURE — 63600175 PHARM REV CODE 636 W HCPCS: Performed by: NURSE ANESTHETIST, CERTIFIED REGISTERED

## 2019-02-28 PROCEDURE — G0378 HOSPITAL OBSERVATION PER HR: HCPCS

## 2019-02-28 PROCEDURE — 80048 BASIC METABOLIC PNL TOTAL CA: CPT

## 2019-02-28 PROCEDURE — 37000008 HC ANESTHESIA 1ST 15 MINUTES: Performed by: INTERNAL MEDICINE

## 2019-02-28 PROCEDURE — 43239 EGD BIOPSY SINGLE/MULTIPLE: CPT | Performed by: INTERNAL MEDICINE

## 2019-02-28 PROCEDURE — 93010 ELECTROCARDIOGRAM REPORT: CPT | Mod: ,,, | Performed by: INTERNAL MEDICINE

## 2019-02-28 PROCEDURE — 94761 N-INVAS EAR/PLS OXIMETRY MLT: CPT

## 2019-02-28 PROCEDURE — 43242 PR UPGI ENDOSCOPY,FN NEEDLE BX,GUIDED: ICD-10-PCS | Mod: ,,, | Performed by: INTERNAL MEDICINE

## 2019-02-28 PROCEDURE — 93005 ELECTROCARDIOGRAM TRACING: CPT | Mod: 59

## 2019-02-28 PROCEDURE — 43239 PR EGD, FLEX, W/BIOPSY, SGL/MULTI: ICD-10-PCS | Mod: 59,,, | Performed by: INTERNAL MEDICINE

## 2019-02-28 PROCEDURE — 81596 NFCT DS CHRNC HCV 6 ASSAYS: CPT

## 2019-02-28 PROCEDURE — 87902 NFCT AGT GNTYP ALYS HEP C: CPT

## 2019-02-28 PROCEDURE — 88305 TISSUE EXAM BY PATHOLOGIST: CPT | Performed by: PATHOLOGY

## 2019-02-28 PROCEDURE — 93010 EKG 12-LEAD: ICD-10-PCS | Mod: ,,, | Performed by: INTERNAL MEDICINE

## 2019-02-28 PROCEDURE — 43239 EGD BIOPSY SINGLE/MULTIPLE: CPT | Mod: 59,,, | Performed by: INTERNAL MEDICINE

## 2019-02-28 PROCEDURE — 85025 COMPLETE CBC W/AUTO DIFF WBC: CPT

## 2019-02-28 PROCEDURE — 63600175 PHARM REV CODE 636 W HCPCS: Performed by: NURSE PRACTITIONER

## 2019-02-28 PROCEDURE — 25000003 PHARM REV CODE 250: Performed by: PHYSICIAN ASSISTANT

## 2019-02-28 PROCEDURE — 25000003 PHARM REV CODE 250: Performed by: NURSE ANESTHETIST, CERTIFIED REGISTERED

## 2019-02-28 PROCEDURE — C9113 INJ PANTOPRAZOLE SODIUM, VIA: HCPCS | Performed by: NURSE PRACTITIONER

## 2019-02-28 PROCEDURE — 87522 HEPATITIS C REVRS TRNSCRPJ: CPT

## 2019-02-28 PROCEDURE — 99204 PR OFFICE/OUTPT VISIT, NEW, LEVL IV, 45-59 MIN: ICD-10-PCS | Mod: ,,, | Performed by: STUDENT IN AN ORGANIZED HEALTH CARE EDUCATION/TRAINING PROGRAM

## 2019-02-28 PROCEDURE — 36415 COLL VENOUS BLD VENIPUNCTURE: CPT

## 2019-02-28 PROCEDURE — 43242 EGD US FINE NEEDLE BX/ASPIR: CPT | Performed by: INTERNAL MEDICINE

## 2019-02-28 PROCEDURE — 96376 TX/PRO/DX INJ SAME DRUG ADON: CPT

## 2019-02-28 PROCEDURE — 27201012 HC FORCEPS, HOT/COLD, DISP: Performed by: INTERNAL MEDICINE

## 2019-02-28 PROCEDURE — 88305 TISSUE EXAM BY PATHOLOGIST: CPT | Mod: 26,,, | Performed by: PATHOLOGY

## 2019-02-28 PROCEDURE — 88305 CYTOLOGY SPECIMEN-FNA NON-RADIOLOGY CLINICIAN PERFORMED W/O ON SITE: ICD-10-PCS | Mod: 26,,, | Performed by: PATHOLOGY

## 2019-02-28 PROCEDURE — 43242 EGD US FINE NEEDLE BX/ASPIR: CPT | Mod: ,,, | Performed by: INTERNAL MEDICINE

## 2019-02-28 RX ORDER — LIDOCAINE HCL/PF 100 MG/5ML
SYRINGE (ML) INTRAVENOUS
Status: DISCONTINUED | OUTPATIENT
Start: 2019-02-28 | End: 2019-02-28

## 2019-02-28 RX ORDER — SODIUM CHLORIDE 9 MG/ML
INJECTION, SOLUTION INTRAVENOUS CONTINUOUS PRN
Status: DISCONTINUED | OUTPATIENT
Start: 2019-02-28 | End: 2019-02-28

## 2019-02-28 RX ORDER — LOSARTAN POTASSIUM 50 MG/1
100 TABLET ORAL DAILY
Status: DISCONTINUED | OUTPATIENT
Start: 2019-02-28 | End: 2019-03-01 | Stop reason: HOSPADM

## 2019-02-28 RX ORDER — PROPOFOL 10 MG/ML
VIAL (ML) INTRAVENOUS CONTINUOUS PRN
Status: DISCONTINUED | OUTPATIENT
Start: 2019-02-28 | End: 2019-02-28

## 2019-02-28 RX ORDER — PROPOFOL 10 MG/ML
VIAL (ML) INTRAVENOUS
Status: DISCONTINUED | OUTPATIENT
Start: 2019-02-28 | End: 2019-02-28

## 2019-02-28 RX ORDER — PHENYLEPHRINE HYDROCHLORIDE 10 MG/ML
INJECTION INTRAVENOUS
Status: DISCONTINUED | OUTPATIENT
Start: 2019-02-28 | End: 2019-02-28

## 2019-02-28 RX ORDER — EPHEDRINE SULFATE 50 MG/ML
INJECTION, SOLUTION INTRAVENOUS
Status: DISCONTINUED | OUTPATIENT
Start: 2019-02-28 | End: 2019-02-28

## 2019-02-28 RX ADMIN — PHENYLEPHRINE HYDROCHLORIDE 200 MCG: 10 INJECTION INTRAVENOUS at 05:02

## 2019-02-28 RX ADMIN — EPHEDRINE SULFATE 10 MG: 50 INJECTION, SOLUTION INTRAMUSCULAR; INTRAVENOUS; SUBCUTANEOUS at 05:02

## 2019-02-28 RX ADMIN — PANTOPRAZOLE SODIUM 40 MG: 40 INJECTION, POWDER, LYOPHILIZED, FOR SOLUTION INTRAVENOUS at 08:02

## 2019-02-28 RX ADMIN — LIDOCAINE HYDROCHLORIDE 100 MG: 20 INJECTION, SOLUTION INTRAVENOUS at 04:02

## 2019-02-28 RX ADMIN — PROPOFOL 30 MG: 10 INJECTION, EMULSION INTRAVENOUS at 04:02

## 2019-02-28 RX ADMIN — PHENYLEPHRINE HYDROCHLORIDE 100 MCG: 10 INJECTION INTRAVENOUS at 05:02

## 2019-02-28 RX ADMIN — PROPOFOL 70 MG: 10 INJECTION, EMULSION INTRAVENOUS at 04:02

## 2019-02-28 RX ADMIN — PANTOPRAZOLE SODIUM 40 MG: 40 INJECTION, POWDER, LYOPHILIZED, FOR SOLUTION INTRAVENOUS at 09:02

## 2019-02-28 RX ADMIN — SODIUM CHLORIDE: 9 INJECTION, SOLUTION INTRAVENOUS at 04:02

## 2019-02-28 RX ADMIN — PROPOFOL 130 MCG/KG/MIN: 10 INJECTION, EMULSION INTRAVENOUS at 04:02

## 2019-02-28 RX ADMIN — LOSARTAN POTASSIUM 100 MG: 50 TABLET, FILM COATED ORAL at 01:02

## 2019-02-28 RX ADMIN — TOPICAL ANESTHETIC 1 EACH: 200 SPRAY DENTAL; PERIODONTAL at 04:02

## 2019-02-28 NOTE — CONSULTS
Patient ID: Saima Dunn is a 73 y.o. female.    Chief Complaint: Rectal Bleeding (73y F ambulatory to ED with c/o bloody stools and coffee ground emesis x1 day, c/o weakness)      HPI:  73F presented to the hospital with BRBPR two days ago. She was admitted to the medicine service and GI was consulted. She has never had this before. Denies any pain, dizziness. She had some nausea earlier but none today. GI performed an EGD yesterday which showed a large submucosal mass in the duodenum with central necrosis. Her HH is 11 down from 13 upon presentation. Today she feels well. No sign of bloody stool today or yesterday. She had a colonoscopy 2 years ago that was unremarkable. She does have COPD and occasionally uses an inhaler. Smoked 1ppd for 50 years, still smoking. Denies WELCH, CP.  Hemodynamically stable throughout, no blood transfusions.         Review of Systems   Constitutional: Negative for fever.   HENT: Negative for trouble swallowing.    Respiratory: Negative for shortness of breath.    Cardiovascular: Negative for chest pain.   Gastrointestinal: Positive for nausea and vomiting. Negative for abdominal pain and blood in stool.   Genitourinary: Negative for dysuria.   Musculoskeletal: Negative for gait problem.   Skin: Negative for rash and wound.   Allergic/Immunologic: Negative for immunocompromised state.   Neurological: Positive for weakness. Negative for light-headedness.   Hematological: Does not bruise/bleed easily.   Psychiatric/Behavioral: Negative for agitation.       Current Facility-Administered Medications   Medication Dose Route Frequency Provider Last Rate Last Dose    acetaminophen tablet 650 mg  650 mg Oral Q4H PRN Maria E Martinez NP        losartan tablet 100 mg  100 mg Oral Daily Kelley Woodward PA-C   100 mg at 02/28/19 1334    ondansetron injection 4 mg  4 mg Intravenous Q8H PRN Maria E Martinez NP        pantoprazole injection 40 mg  40 mg Intravenous BID Maria E  JENNIE Martinez NP   40 mg at 02/28/19 0902    sodium chloride 0.9% flush 5 mL  5 mL Intravenous PRN Maria E Martinez NP           Review of patient's allergies indicates:   Allergen Reactions    Codeine        Past Medical History:   Diagnosis Date    Hepatitis C     Hyperlipidemia     Hypertension        Past Surgical History:   Procedure Laterality Date    COLONOSCOPY miralax split dose prep N/A 12/7/2016    Performed by Zhang [facility] Jr., [facility]    ESOPHAGOGASTRODUODENOSCOPY (EGD) N/A 2/27/2019    Performed by Magui Bradley [facility] at [facility]    HYSTERECTOMY  1967   Lap damon at [facility] 5 years ago    Family History   Problem Relation Age of Onset    Ovarian cancer Mother     Ovarian cancer Sister        Social History     Socioeconomic History    Marital status:      Spouse name: Not on file    Number of children: Not on file    Years of education: Not on file    Highest education level: Not on file   Social Needs    Financial resource strain: Not on file    Food insecurity - worry: Not on file    Food insecurity - inability: Not on file    Transportation needs - medical: Not on file    Transportation needs - non-medical: Not on file   Occupational History    Not on file   Tobacco Use    Smoking status: Current Every Day Smoker     Packs/day: 0.25    Tobacco comment: Enrolled in [employer]   Substance and Sexual Activity    Alcohol use: Not on file    Drug use: Not on file    Sexual activity: Not on file   Other Topics Concern    Not on file   Social History Narrative    Not on file       Vitals:    02/28/19 1202   BP:    Pulse: 87   Resp: 18   Temp:        Physical Exam   Constitutional: She is oriented to person, place, and time. She appears well-developed and well-nourished. No distress.   HENT:   Head: Normocephalic and atraumatic.   Eyes: No scleral icterus.   Cardiovascular: Normal rate.   Pulmonary/Chest: Effort normal. No stridor.   Abdominal:  Soft. She exhibits no distension. There is no tenderness.   Lymphadenopathy:     She has no cervical adenopathy.   Neurological: She is alert and oriented to person, place, and time.   Skin: Skin is warm. No erythema.   Psychiatric: She has a normal mood and affect. Her behavior is normal.     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   Will need CT with contrast chest, ab, pelvis   HCV pending   Stable HD, no plans for urgent surgery  Continue PPI  Getting EUS today. Will follow

## 2019-02-28 NOTE — MEDICAL/APP STUDENT
"[facility] Medicine  Progress Note     Patient Name: Saima Dunn  MRN: [id number]  Admission Date: 2/26/2019  Hospital Length of Stay: 0 days  Code Status: Full Code   Admitting Physician: Axel Cortez MD   Primary Care Physician: Thiago Romero MD  Principal Problem:GI bleed    Subjective:     HPI: Ms Dunn is a 72 YO smoker (quarter pk/dy) with a history of COPD, HCV (Dx'ed Jan 2017, suspected as a complication of her pregnancy prior to HCV screening of blood products), controlled HTN, and HLD who presented to [facility] ED the evening of 2/27 with onset at 1600 following a nap of 1 day of hematochezia associated with 1 episode of possible hematemesis described as "coffee with cream-colored" of large volume (described as several cups), generalized weakness, diaphoresis, and dyspnea. Denies any history of these symptoms. Denies any abdominal pain, diarrhea, appetite changes, chest pain, palpitations, or pre/syncope. Notes prior colonoscopy for rectal bleeding in 2018, which was unremarkable (said she was told it was likely 2/2 hemorrhoids), and yearly mammograms that were normal.      Hospital course:  2/26: Presented to [facility] ED and evaluated. H&H low but stable at 13/39 with BUN 32 without creatinine elevation, FOBT positive, normal PT/INR. O/W BMP and liver panel unremarkable. Patient hemodynamically stable with no active bleeding noted on initial H&P. Given 1L NSL. Admitted to CDU for further evaluation. Given single dose Protonix 80 mg IV. Counsoled regarding smoking cessation, to which patient agrees.     2/27: Now on 40 mg IV Protonix BID. Patient denies any further hematemesis or melenic episodes. Repeat H&H 11.6/34 today. Patient seen by Dr. Joshi of Gastro team, who scheduled patient for EGD: revealed a large, pedunculated mass in the duodenal bulb with ulceration, consistent with patient's GI bleed presentation. GI plans for EUS for further lesion " characterization 2/28.    Interval history: NAEON. Patient denies any change in stools, hematemesis, or further melanic episodes. Patient says she has chronic arthritis, worse in her SI joints, unchanged from before, so she prefers to sleep on her side with her legs tucked up towards her chest. Says she wants to walk around a bit today before EUS with her family, which alleviates her pain.    Past Medical History:   Diagnosis Date    Hepatitis C     Hyperlipidemia     Hypertension        Past Surgical History:   Procedure Laterality Date    COLONOSCOPY miralax split dose prep N/A 12/7/2016    [facility]    ESOPHAGOGASTRODUODENOSCOPY (EGD) N/A 2/27/2019    [facility]    HYSTERECTOMY  1967       Family History   Problem Relation Age of Onset    Ovarian cancer Mother     Ovarian cancer Sister        Social History     Socioeconomic History    Marital status:      Spouse name: None    Number of children: None    Years of education: None    Highest education level: None   Social Needs    Financial resource strain: None    Food insecurity - worry: None    Food insecurity - inability: None    Transportation needs - medical: None    Transportation needs - non-medical: None   Occupational History    None   Tobacco Use    Smoking status: Current Every Day Smoker     Packs/day: 0.25    Tobacco comment: Enrolled in [employer]ess[employer]   Substance and Sexual Activity    Alcohol use: None    Drug use: None    Sexual activity: None   Other Topics Concern    None   Social History Narrative    None       Current Facility-Administered Medications   Medication Dose Route Frequency Provider Last Rate Last Dose    acetaminophen tablet 650 mg  650 mg Oral Q4H PRN Maira E Martinez NP        ondansetron injection 4 mg  4 mg Intravenous Q8H PRN Maria E Martinez NP        pantoprazole injection 40 mg  40 mg  Intravenous BID Maria E Martinez NP   40 mg at 02/28/19 0902    sodium chloride 0.9% flush 5 mL  5 mL Intravenous PRN Maria E Martinez NP           Review of patient's allergies indicates:   Allergen Reactions    Codeine      Review of Systems   Constitutional: Negative for diaphoresis and malaise/fatigue.   Respiratory: Negative for cough, hemoptysis and shortness of breath.    Cardiovascular: Negative for chest pain and palpitations.   Gastrointestinal: Negative for abdominal pain, blood in stool, diarrhea, melena, nausea and vomiting.   Genitourinary: Negative for dysuria and urgency.   Neurological: Negative for dizziness and headaches.     Objective:    Vitals:    02/28/19 0500 02/28/19 0644 02/28/19 0730 02/28/19 0738   BP: (!) 108/58 (!) 116/58 (!) 116/58    BP Location: Left arm  Left arm    Patient Position: Lying Lying Lying    Pulse: 67 71 71 72   Resp: 16 20 20    Temp: 97.6 °F (36.4 °C) 98.4 °F (36.9 °C) 98.4 °F (36.9 °C)    TempSrc: Oral Oral Oral    SpO2:  95% 95%    Weight:       Height:         Physical Exam   Constitutional: She is oriented to person, place, and time. She appears well-developed and well-nourished. No distress.   Eyes: Conjunctivae and EOM are normal. No scleral icterus.   Cardiovascular: Normal rate and regular rhythm.   Normal S1 and S2, with midcystolic click appreciated as yesterday, unchanged from past exam.   Pulmonary/Chest: Effort normal. No respiratory distress.   Coarse expiratory wheezes throughout, unchanged from my past exam   Abdominal: Soft. Bowel sounds are normal. She exhibits no distension. There is no tenderness. There is no rebound and no guarding.   Neurological: She is alert and oriented to person, place, and time.   Skin: Skin is warm and dry.     Recent Lab Results       02/28/19  0738        Anion Gap 3     BUN, Bld 13     Calcium 9.0     Chloride 107     CO2 27     Creatinine 0.8     eGFR if  >60     eGFR if non   >60  Comment:  Calculation used to obtain the estimated glomerular filtration  rate (eGFR) is the CKD-EPI equation.        Glucose 104     Potassium 3.9     Sodium 137         Pertinent Imaging    No radiology to report.    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 There is ulceration in the surface of the lesion with a flat red spot.This is the likely source for bleeding.    EUS to be performed this afternoon    Assessment & Plan:    Principal Problem: GI bleed  - No signs of bleeding on my exam, patient reports no hematemesis/melena/hematochezia overnight following EGD. No change in appetite (held NPO in anticipation of EUS today).  - No CBC values today, but BUN is normalized. BMP unremarkable. BP/pulse stable, no fluid requirements.  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 They did not resect the mass, did not collect specimens as they wish to further characterize EUS. No other significant findings.  - Given the nature of patient's presentation (sudden onset of single episode of hematemesis + hematochezia), and the EGD findings, I am satisfied that the lesion observed on EGD is the source of the patient's GI bleed. GIST can commonly present with hematemesis and/or hematochezia. I concur with GI opinion regarding EUS to further characterize this lesion, and further improve management decision.  - GI reccs s/p EGD: NO NSAIDS, Clear liquid diet (do not advance), held NPO today for EUS, continue PPIs. Appreciate their reccs, will follow.  - 40 mg Protonix IV BID    Chronic Hepatitis C  - Stable per my last progress note  - To be treated as outpatient, with inquiry regarding HBV/HAV testing/vaccination as outpatient matter as well    Hyperlipidemia  - Continue home 20 mg  simvastatin PO    Arthritis  - Chronic per patient, unchanged from today, worse in AM on waking, better sleeping on her side.  - PRN acetaminophen 650 if needed.   - NO NSAIDS per GI.    HTN  - SBP goal 102-144, running 110s/80s today, stable  - Continue home losartan 50 mg daily.    COPD:  - Smoking cessation consult as previously, patient wishes to give up smoking.  - No signs of respiratory distress, patient comfortably satting in high 90s on RA, no oxygenation requirements  - PRN DuoNeb if required     [facility] Medicine: Evangelist Paredes MSY3 - Dr Lindsey (in for Dr. Cortez today).

## 2019-02-28 NOTE — ASSESSMENT & PLAN NOTE
Dangers of cigarette smoking were reviewed with patient in detail for 5 minutes and patient was encouraged to quit. Nicotine replacement options were discussed and patient denied need for patch at this time.

## 2019-02-28 NOTE — TRANSFER OF CARE
"Anesthesia Transfer of Care Note    Patient: Saima Dunn    Procedure(s) Performed: Procedure(s) (LRB):  ULTRASOUND, UPPER GI TRACT, ENDOSCOPIC (N/A)    Patient location: GI    Anesthesia Type: MAC    Transport from OR: Transported from OR on room air with adequate spontaneous ventilation    Post pain: adequate analgesia    Post assessment: no apparent anesthetic complications and tolerated procedure well    Post vital signs: stable    Level of consciousness: sedated    Nausea/Vomiting: no nausea/vomiting    Complications: none    Transfer of care protocol was followed      Last vitals:   Visit Vitals  BP (!) 113/54   Pulse 96   Temp 37.1 °C (98.8 °F)   Resp 18   Ht 4' 10" (1.473 m)   Wt 52 kg (114 lb 10.2 oz)   LMP 06/01/1966   SpO2 99%   Breastfeeding? No   BMI 23.96 kg/m²     "

## 2019-02-28 NOTE — ASSESSMENT & PLAN NOTE
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

## 2019-02-28 NOTE — SUBJECTIVE & OBJECTIVE
Interval History: Pt examined at bedside. Her daughter is present. She complains of chronic arthritic hip pain and has been walking around the unit for relief. She denies any other acute complaints. No recent BMs or N/V. NPO since midnight for EUS later today.    Review of Systems   Constitutional: Negative for chills, diaphoresis and fever.   HENT: Negative for congestion.    Respiratory: Negative for cough, chest tightness, shortness of breath and wheezing.    Cardiovascular: Negative for chest pain, palpitations and leg swelling.   Gastrointestinal: Negative for abdominal pain, blood in stool, diarrhea, nausea and vomiting.   Genitourinary: Negative for dysuria, flank pain, frequency and hematuria.   Musculoskeletal: Positive for arthralgias. Negative for back pain and myalgias.   Neurological: Negative for dizziness, syncope, weakness, light-headedness and headaches.   Psychiatric/Behavioral: Negative for confusion.     Objective:     Vital Signs (Most Recent):  Temp: 98.4 °F (36.9 °C) (02/28/19 0730)  Pulse: 72 (02/28/19 0738)  Resp: 20 (02/28/19 0730)  BP: (!) 116/58 (02/28/19 0730)  SpO2: 95 % (02/28/19 0730) Vital Signs (24h Range):  Temp:  [97.6 °F (36.4 °C)-98.4 °F (36.9 °C)] 98.4 °F (36.9 °C)  Pulse:  [64-79] 72  Resp:  [16-24] 20  SpO2:  [94 %-99 %] 95 %  BP: ()/(51-74) 116/58     Weight: 52 kg (114 lb 10.2 oz)  Body mass index is 23.96 kg/m².    Intake/Output Summary (Last 24 hours) at 2/28/2019 1016  Last data filed at 2/27/2019 1608  Gross per 24 hour   Intake 100 ml   Output --   Net 100 ml      Physical Exam   Constitutional: She is oriented to person, place, and time. She appears well-developed and well-nourished.   Eyes: Conjunctivae are normal. Pupils are equal, round, and reactive to light.   Cardiovascular: Normal rate, regular rhythm, normal heart sounds and intact distal pulses.   Pulmonary/Chest: Effort normal. No respiratory distress. She has no wheezes. She has no rales.   Abdominal:  Soft. Bowel sounds are normal. She exhibits no distension. There is no tenderness. There is no guarding.   Musculoskeletal: Normal range of motion. She exhibits no edema or tenderness.   Neurological: She is alert and oriented to person, place, and time. No cranial nerve deficit.   Skin: Skin is warm and dry. Capillary refill takes less than 2 seconds.   Psychiatric: She has a normal mood and affect. Her behavior is normal.   Nursing note and vitals reviewed.      Significant Labs: All pertinent labs within the past 24 hours have been reviewed.    Significant Imaging: I have reviewed all pertinent imaging results/findings within the past 24 hours.

## 2019-02-28 NOTE — PLAN OF CARE
Report received from PASTORA Frances. NPO p MN. Patent IV access. Tentative procedure is early afternoon.

## 2019-02-28 NOTE — ASSESSMENT & PLAN NOTE
Chronic, stable. No current complaints. Encouraged smoking cessation and patient is interested in quitting.  Prn nebs

## 2019-02-28 NOTE — ANESTHESIA PREPROCEDURE EVALUATION
02/28/2019  Saima Dunn is a 73 y.o., female with GI bleed for upper EUS under gen/mac. Had EGD yesterday without anesthesia complication.     Past Medical History:   Diagnosis Date    Hepatitis C     Hyperlipidemia     Hypertension    smoker    Past Surgical History:   Procedure Laterality Date    COLONOSCOPY miralax split dose prep N/A 12/7/2016    Performed by Zhang Jorge Jr., MD at [facility] ENDO    ESOPHAGOGASTRODUODENOSCOPY (EGD) N/A 2/27/2019    Performed by Magui Bradley MD at [facility] ENDO    HYSTERECTOMY  1967         Anesthesia Evaluation    I have reviewed the Patient Summary Reports.    I have reviewed the Nursing Notes.   I have reviewed the Medications.     Review of Systems  Anesthesia Hx:  No problems with previous Anesthesia    Social:  Smoker    Hematology/Oncology:         -- Anemia:   Cardiovascular:   Hypertension hyperlipidemia    Pulmonary:   COPD    Renal/:  Renal/ Normal     Hepatic/GI:   Liver Disease, Hepatitis   GI bleed.       Physical Exam  General:  Well nourished    Airway/Jaw/Neck:  Airway Findings: Mouth Opening: Normal Tongue: Normal  General Airway Assessment: Adult  Mallampati: II  TM Distance: Normal, at least 6 cm       Chest/Lungs:  Chest/Lungs Findings: Clear to auscultation, Normal Respiratory Rate     Heart/Vascular:  Heart Findings: Rate: Normal  Rhythm: Regular Rhythm  Sounds: Normal     Abdomen:  Abdomen Findings:  Normal            Lab Results   Component Value Date    WBC 11.01 02/26/2019    HGB 11.6 (L) 02/27/2019    HCT 34.4 (L) 02/27/2019     02/26/2019    ALT 22 02/26/2019    AST 20 02/26/2019     02/28/2019    K 3.9 02/28/2019     02/28/2019    CREATININE 0.8 02/28/2019    BUN 13 02/28/2019    CO2 27 02/28/2019    INR 1.0 02/26/2019         Anesthesia Plan  Type of Anesthesia, risks & benefits  discussed:  Anesthesia Type:  MAC, general  Patient's Preference:   Intra-op Monitoring Plan: standard ASA monitors  Intra-op Monitoring Plan Comments:   Post Op Pain Control Plan:   Post Op Pain Control Plan Comments:   Induction:   IV  Beta Blocker:  Patient is not currently on a Beta-Blocker (No further documentation required).       Informed Consent: Patient understands risks and agrees with Anesthesia plan.  Questions answered. Anesthesia consent signed with patient.  ASA Score: 3     Day of Surgery Review of History & Physical:  There are no significant changes.          Ready For Surgery From Anesthesia Perspective.

## 2019-02-28 NOTE — PROGRESS NOTES
[facility] Hospital Medicine  Progress Note    Patient Name: Saima Dunn  MRN: [id number]  Patient Class: OP- Observation   Admission Date: 2/26/2019  Length of Stay: 0 days  Attending Physician: Axel Cortez MD  Primary Care Provider: DALLAS Salvador    Subjective:     Principal Problem:GI bleed    HPI:  70 yo female with HTN, HLD, COPD, hepatitis C and tobacco abuse presented to ED with complaint of bright red blood per rectum followed by melena. Onset prior to arrival. The patient reports associated coffee ground emesis, generalized weakness and shortness of breath. She denies chest pain, palpitations and syncope. Labs in ED remarkable for H/H 13/39 , BUN 32. Occult blood positive. Pt hemodynamically stable. No active bleeding on exam. She reports last colonoscopy 1 year ago with no abnormalities, no recent upper GI studies. Patient admitted to  CDU for further evaluation. GI consulted.           Hospital Course:  Patient admitted for observation to the CDU. She was given protonix IV BID. Patient reported last bloody BM at 3 am on day of admission with no additional episodes since. Hgb was trended and decreased to 11.6. 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 Plan for EUS to further evaluate on 2/28/19.    Interval History: Pt examined at bedside. Her daughter is present. She complains of chronic arthritic hip pain and has been walking around the unit for relief. She denies any other acute complaints. No recent BMs or N/V. NPO since midnight for EUS later today.    Review of Systems   Constitutional: Negative for chills, diaphoresis and fever.   HENT: Negative for congestion.    Respiratory: Negative for cough, chest tightness, shortness of breath and wheezing.    Cardiovascular: Negative for chest pain, palpitations and leg swelling.   Gastrointestinal: Negative  for abdominal pain, blood in stool, diarrhea, nausea and vomiting.   Genitourinary: Negative for dysuria, flank pain, frequency and hematuria.   Musculoskeletal: Positive for arthralgias. Negative for back pain and myalgias.   Neurological: Negative for dizziness, syncope, weakness, light-headedness and headaches.   Psychiatric/Behavioral: Negative for confusion.     Objective:     Vital Signs (Most Recent):  Temp: 98.4 °F (36.9 °C) (02/28/19 0730)  Pulse: 72 (02/28/19 0738)  Resp: 20 (02/28/19 0730)  BP: (!) 116/58 (02/28/19 0730)  SpO2: 95 % (02/28/19 0730) Vital Signs (24h Range):  Temp:  [97.6 °F (36.4 °C)-98.4 °F (36.9 °C)] 98.4 °F (36.9 °C)  Pulse:  [64-79] 72  Resp:  [16-24] 20  SpO2:  [94 %-99 %] 95 %  BP: ()/(51-74) 116/58     Weight: 52 kg (114 lb 10.2 oz)  Body mass index is 23.96 kg/m².    Intake/Output Summary (Last 24 hours) at 2/28/2019 1016  Last data filed at 2/27/2019 1608  Gross per 24 hour   Intake 100 ml   Output --   Net 100 ml      Physical Exam   Constitutional: She is oriented to person, place, and time. She appears well-developed and well-nourished.   Eyes: Conjunctivae are normal. Pupils are equal, round, and reactive to light.   Cardiovascular: Normal rate, regular rhythm, normal heart sounds and intact distal pulses.   Pulmonary/Chest: Effort normal. No respiratory distress. She has no wheezes. She has no rales.   Abdominal: Soft. Bowel sounds are normal. She exhibits no distension. There is no tenderness. There is no guarding.   Musculoskeletal: Normal range of motion. She exhibits no edema or tenderness.   Neurological: She is alert and oriented to person, place, and time. No cranial nerve deficit.   Skin: Skin is warm and dry. Capillary refill takes less than 2 seconds.   Psychiatric: She has a normal mood and affect. Her behavior is normal.   Nursing note and vitals reviewed.      Significant Labs: All pertinent labs within the past 24 hours have been reviewed.    Significant  Imaging: I have reviewed all pertinent imaging results/findings within the past 24 hours.    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 Nicotine replacement options were discussed and patient denied need for patch at this time.         Hepatitis C    Appreciate GI. HCV labs pending. Plan for outpatient treatment       Hyperlipidemia    Continue simvastatin 20 mg daily        Hypertension    /58 today. Patient takes losartan at home. Resume     COPD (chronic obstructive pulmonary disease)    Chronic, stable. No current complaints. Encouraged smoking cessation and patient is interested in quitting.  Prn nebs          VTE Risk Mitigation (From admission, onward)        Ordered     IP VTE HIGH RISK PATIENT  Once      02/27/19 0222     Place sequential compression device  Until discontinued      02/27/19 0222          Kelley Woodward PA-C  Department of Hospital Medicine   [facility]

## 2019-03-01 VITALS
HEART RATE: 64 BPM | TEMPERATURE: 98 F | WEIGHT: 114.63 LBS | HEIGHT: 58 IN | BODY MASS INDEX: 24.06 KG/M2 | RESPIRATION RATE: 16 BRPM | DIASTOLIC BLOOD PRESSURE: 58 MMHG | SYSTOLIC BLOOD PRESSURE: 120 MMHG | OXYGEN SATURATION: 99 %

## 2019-03-01 PROBLEM — K92.2 GI BLEED: Status: RESOLVED | Noted: 2019-02-27 | Resolved: 2019-03-01

## 2019-03-01 LAB
BASOPHILS # BLD AUTO: 0.02 K/UL
BASOPHILS NFR BLD: 0.3 %
DIFFERENTIAL METHOD: ABNORMAL
EOSINOPHIL # BLD AUTO: 0.2 K/UL
EOSINOPHIL NFR BLD: 3.7 %
ERYTHROCYTE [DISTWIDTH] IN BLOOD BY AUTOMATED COUNT: 13.1 %
HCT VFR BLD AUTO: 32.3 %
HGB BLD-MCNC: 11.1 G/DL
LYMPHOCYTES # BLD AUTO: 1.3 K/UL
LYMPHOCYTES NFR BLD: 20.1 %
MCH RBC QN AUTO: 31.6 PG
MCHC RBC AUTO-ENTMCNC: 34.4 G/DL
MCV RBC AUTO: 92 FL
MONOCYTES # BLD AUTO: 0.6 K/UL
MONOCYTES NFR BLD: 10.3 %
NEUTROPHILS # BLD AUTO: 4.1 K/UL
NEUTROPHILS NFR BLD: 65.3 %
PLATELET # BLD AUTO: 161 K/UL
PMV BLD AUTO: 9.8 FL
RBC # BLD AUTO: 3.51 M/UL
WBC # BLD AUTO: 6.22 K/UL

## 2019-03-01 PROCEDURE — 99213 PR OFFICE/OUTPT VISIT, EST, LEVL III, 20-29 MIN: ICD-10-PCS | Mod: ,,, | Performed by: INTERNAL MEDICINE

## 2019-03-01 PROCEDURE — 36415 COLL VENOUS BLD VENIPUNCTURE: CPT

## 2019-03-01 PROCEDURE — 63600175 PHARM REV CODE 636 W HCPCS: Performed by: NURSE PRACTITIONER

## 2019-03-01 PROCEDURE — 85025 COMPLETE CBC W/AUTO DIFF WBC: CPT

## 2019-03-01 PROCEDURE — 99213 OFFICE O/P EST LOW 20 MIN: CPT | Mod: ,,, | Performed by: INTERNAL MEDICINE

## 2019-03-01 PROCEDURE — 96376 TX/PRO/DX INJ SAME DRUG ADON: CPT

## 2019-03-01 PROCEDURE — C9113 INJ PANTOPRAZOLE SODIUM, VIA: HCPCS | Performed by: NURSE PRACTITIONER

## 2019-03-01 PROCEDURE — G0378 HOSPITAL OBSERVATION PER HR: HCPCS

## 2019-03-01 PROCEDURE — 25000003 PHARM REV CODE 250: Performed by: PHYSICIAN ASSISTANT

## 2019-03-01 RX ORDER — OXYCODONE AND ACETAMINOPHEN 5; 325 MG/1; MG/1
1 TABLET ORAL
Status: DISCONTINUED | OUTPATIENT
Start: 2019-03-01 | End: 2019-03-01 | Stop reason: HOSPADM

## 2019-03-01 RX ORDER — DIPHENHYDRAMINE HYDROCHLORIDE 50 MG/ML
25 INJECTION INTRAMUSCULAR; INTRAVENOUS EVERY 6 HOURS PRN
Status: DISCONTINUED | OUTPATIENT
Start: 2019-03-01 | End: 2019-03-01 | Stop reason: HOSPADM

## 2019-03-01 RX ORDER — HYDROMORPHONE HYDROCHLORIDE 2 MG/ML
0.2 INJECTION, SOLUTION INTRAMUSCULAR; INTRAVENOUS; SUBCUTANEOUS EVERY 5 MIN PRN
Status: DISCONTINUED | OUTPATIENT
Start: 2019-03-01 | End: 2019-03-01 | Stop reason: HOSPADM

## 2019-03-01 RX ORDER — PANTOPRAZOLE SODIUM 40 MG/1
40 TABLET, DELAYED RELEASE ORAL DAILY
Qty: 30 TABLET | Refills: 2 | Status: SHIPPED | OUTPATIENT
Start: 2019-03-01 | End: 2020-02-29

## 2019-03-01 RX ORDER — HYDROMORPHONE HYDROCHLORIDE 2 MG/ML
0.5 INJECTION, SOLUTION INTRAMUSCULAR; INTRAVENOUS; SUBCUTANEOUS EVERY 5 MIN PRN
Status: DISCONTINUED | OUTPATIENT
Start: 2019-03-01 | End: 2019-03-01 | Stop reason: HOSPADM

## 2019-03-01 RX ADMIN — LOSARTAN POTASSIUM 100 MG: 50 TABLET, FILM COATED ORAL at 08:03

## 2019-03-01 RX ADMIN — PANTOPRAZOLE SODIUM 40 MG: 40 INJECTION, POWDER, LYOPHILIZED, FOR SOLUTION INTRAVENOUS at 08:03

## 2019-03-01 NOTE — PLAN OF CARE
0 Pt admit to [facility] for Hydration/Labs ambulatory in stable condition. Assessment completed. Patient complained of nausea with no relief from home medications. Port accessed and flushed with positive blood return. Labs drawn per order and sent for processing. Hydration started. Visit Vitals    /89    Pulse 71    Temp 98.1 °F (36.7 °C)    Resp 18    Ht 5' 3\" (1.6 m)    Wt 44.5 kg (98 lb)    SpO2 98%    BMI 17.36 kg/m2       Medications:  Hydration 2000ml  Compazine IV Push  Heparin Flush    1540 Pt tolerated treatment well. Port maintained positive blood return throughout treatment, flushed with positive blood return at conclusion, port heparinized and de-accessed. D/c home ambulatory in no distress. Pt aware of next appointment scheduled for 11/14/17. Recent Results (from the past 12 hour(s))   CBC WITH AUTOMATED DIFF    Collection Time: 10/26/17  1:15 PM   Result Value Ref Range    WBC 17.6 (H) 3.6 - 11.0 K/uL    RBC 3.68 (L) 3.80 - 5.20 M/uL    HGB 11.3 (L) 11.5 - 16.0 g/dL    HCT 35.2 35.0 - 47.0 %    MCV 95.7 80.0 - 99.0 FL    MCH 30.7 26.0 - 34.0 PG    MCHC 32.1 30.0 - 36.5 g/dL    RDW 15.4 (H) 11.5 - 14.5 %    PLATELET 626 909 - 461 K/uL    NEUTROPHILS 84 (H) 32 - 75 %    LYMPHOCYTES 11 (L) 12 - 49 %    MONOCYTES 5 5 - 13 %    EOSINOPHILS 0 0 - 7 %    BASOPHILS 0 0 - 1 %    ABS. NEUTROPHILS 14.8 (H) 1.8 - 8.0 K/UL    ABS. LYMPHOCYTES 1.9 0.8 - 3.5 K/UL    ABS. MONOCYTES 0.8 0.0 - 1.0 K/UL    ABS. EOSINOPHILS 0.0 0.0 - 0.4 K/UL    ABS.  BASOPHILS 0.0 0.0 - 0.1 K/UL   METABOLIC PANEL, COMPREHENSIVE    Collection Time: 10/26/17  1:15 PM   Result Value Ref Range    Sodium 132 (L) 136 - 145 mmol/L    Potassium 3.5 3.5 - 5.1 mmol/L    Chloride 91 (L) 97 - 108 mmol/L    CO2 34 (H) 21 - 32 mmol/L    Anion gap 7 5 - 15 mmol/L    Glucose 108 (H) 65 - 100 mg/dL    BUN 25 (H) 6 - 20 MG/DL    Creatinine 1.24 (H) 0.55 - 1.02 MG/DL    BUN/Creatinine ratio 20 12 - 20      GFR est AA 54 (L) >60 D/C rounds complete. All questions answered.  Nurse to discuss d/c medications.  Discussed need to keep f/u appts, adherence to medication regimen for health maintenance, verbalized understanding.    Attempted appt with Dr Joshi for path results, informed office will call pt with appt.  Information for clinic contact placed in AVS.  Pt has no DME or home health needs.  Daughter will be by to pick her up at d/c.       03/01/19 1004   Final Note   Assessment Type Final Discharge Note   Anticipated Discharge Disposition Home   What phone number can be called within the next 1-3 days to see how you are doing after discharge? ([phone])   Hospital Follow Up  Appt(s) scheduled? Yes   Discharge plans and expectations educations in teach back method with documentation complete? Yes   Right Care Referral Info   Post Acute Recommendation No Care       Dorcas Nunes, RN    [phone]   ml/min/1.73m2    GFR est non-AA 45 (L) >60 ml/min/1.73m2    Calcium 9.3 8.5 - 10.1 MG/DL    Bilirubin, total 0.4 0.2 - 1.0 MG/DL    ALT (SGPT) 19 12 - 78 U/L    AST (SGOT) 31 15 - 37 U/L    Alk.  phosphatase 182 (H) 45 - 117 U/L    Protein, total 7.6 6.4 - 8.2 g/dL    Albumin 4.0 3.5 - 5.0 g/dL    Globulin 3.6 2.0 - 4.0 g/dL    A-G Ratio 1.1 1.1 - 2.2     MAGNESIUM    Collection Time: 10/26/17  1:15 PM   Result Value Ref Range    Magnesium 2.0 1.6 - 2.4 mg/dL   URINALYSIS W/MICROSCOPIC    Collection Time: 10/26/17  2:33 PM   Result Value Ref Range    Color YELLOW/STRAW      Appearance CLEAR CLEAR      Specific gravity 1.028 1.003 - 1.030      pH (UA) 7.0 5.0 - 8.0      Protein 30 (A) NEG mg/dL    Glucose NEGATIVE  NEG mg/dL    Ketone NEGATIVE  NEG mg/dL    Bilirubin NEGATIVE  NEG      Blood NEGATIVE  NEG      Urobilinogen 0.2 0.2 - 1.0 EU/dL    Nitrites NEGATIVE  NEG      Leukocyte Esterase NEGATIVE  NEG      WBC 5-10 0 - 4 /hpf    RBC 0-5 0 - 5 /hpf    Epithelial cells FEW FEW /lpf    Bacteria NEGATIVE  NEG /hpf

## 2019-03-01 NOTE — DISCHARGE SUMMARY
[facility] Hospital Medicine  Discharge Summary      Patient Name: Saima Dunn  MRN: [id number]  Admission Date: 2/26/2019  Hospital Length of Stay: 0 days  Discharge Date and Time: 3/1/2019 11:12 AM  Attending Physician: Axel Cortez MD  Discharging Provider: Kelley Woodward PA-C  Primary Care Provider: DALLAS Salvador      HPI:   72 yo female with HTN, HLD, COPD, hepatitis C and tobacco abuse presented to ED with complaint of bright red blood per rectum followed by melena. Onset prior to arrival. The patient reports associated coffee ground emesis, generalized weakness and shortness of breath. She denies chest pain, palpitations and syncope. Labs in ED remarkable for H/H 13/39 , BUN 32. Occult blood positive. Pt hemodynamically stable. No active bleeding on exam. She reports last colonoscopy 1 year ago with no abnormalities, no recent upper GI studies. Patient admitted to  CDU for further evaluation. GI consulted.         Procedure(s) (LRB):  ULTRASOUND, UPPER GI TRACT, ENDOSCOPIC (N/A)      Hospital Course:   Patient was admitted for observation to the CDU. She was given protonix IV BID. Hgb was trended and remained stable above 11. 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 EUS was performed on 2/28/19 with biopsy taken of duodenal mass which resembled a possible lipoma on appearance. Patient reported a BM with BRB at 3 am on day of admission and a small BM with melena and minimal blood clots on morning of discharge. She remained asymptomatic and hemodynamically stable. Patient was discharged to home in good condition with pantoprazole 40 mg PO daily and GI follow-up in the next 2 weeks to discuss hcv treatment and follow-up pathologies.     Consults:   Consults (From admission, onward)        Status Ordering Provider     Inpatient consult to [facility]   Once     Provider:  Nilda Joshi MD    Completed DAYTON ROJAS new Assessment & Plan notes have been filed under this hospital service since the last note was generated.  Service: Hospital Medicine    Final Active Diagnoses:    Diagnosis Date Noted POA    Duodenal mass [K31.89]  Yes    COPD (chronic obstructive pulmonary disease) [J44.9] 02/27/2019 Yes    Hypertension [I10] 02/27/2019 Yes    Hyperlipidemia [E78.5] 02/27/2019 Yes    Hepatitis C [B19.20] 02/27/2019 Yes    Tobacco abuse [Z72.0] 02/27/2019 Yes      Problems Resolved During this Admission:    Diagnosis Date Noted Date Resolved POA    PRINCIPAL PROBLEM:  GI bleed [K92.2] 02/27/2019 03/01/2019 Yes    BRBPR (bright red blood per rectum) [K62.5]  03/01/2019 Yes       Discharged Condition: good    Disposition: Home or Self Care    Follow Up:  Follow-up Information     Nilda Joshi MD On 3/15/2019.    Specialty:  Gastroenterology  Why:  Dr Joshi't office will call you with a follow up appointment to discuss your biopsy results.  If you have not received a call by Wednesday next week with an appointment please call the office.  Contact information:  [address] [facility] [place]  [address] [place] [address] [phone]                 Patient Instructions:      Diet Cardiac     Notify your health care provider if you experience any of the following:  persistent dizziness, light-headedness, or visual disturbances     Notify your health care provider if you experience any of the following:  difficulty breathing or increased cough     Notify your health care provider if you experience any of the following:  increased confusion or weakness     Activity as tolerated       Significant Diagnostic Studies: Labs: All labs within the past 24 hours have been reviewed    Pending Diagnostic Studies:     Procedure Component Value Units Date/Time    HCV FibroSURE [[id number]] Collected:  02/28/19 0535    Order Status:  Sent Lab Status:  In process  Updated:  02/28/19 1103    Specimen:  Blood     Hepatitis C genotype [[id number]] Collected:  02/28/19 0535    Order Status:  Sent Lab Status:  In process Updated:  02/28/19 1103    Specimen:  Blood          Medications:  Reconciled Home Medications:      Medication List      START taking these medications    pantoprazole 40 MG tablet  Commonly known as:  PROTONIX  Take 1 tablet (40 mg total) by mouth once daily.        CONTINUE taking these medications    BABY ASPIRIN ORAL  Take 81 mg by mouth once daily.     gabapentin 100 MG capsule  Commonly known as:  NEURONTIN  Take 100 mg by mouth 3 (three) times daily.     losartan 50 MG tablet  Commonly known as:  COZAAR  Take 100 mg by mouth once daily.     simvastatin 20 MG tablet  Commonly known as:  ZOCOR  Take 20 mg by mouth every other day.        STOP taking these medications    calcium carbonate 500 mg calcium (1,250 mg) tablet  Commonly known as:  OS-AMY     calcium carbonate 600 mg calcium (1,500 mg) Tab  Commonly known as:  OS-AMY            Indwelling Lines/Drains at time of discharge:   Lines/Drains/Airways          None          Time spent on the discharge of patient: 35 minutes  Patient was seen and examined on the date of discharge and determined to be suitable for discharge.    PORSHA James MD  [facility]  MD Alcon Hall MD Ijeoma Innocent-Ituah, MD Fadi Hawawini, MD Elizabeth Knipp, PA-C Brittany Chatman, NP Kristin Stein, PA-C Arekeva Selmon, NP-C  [address] [place] [id number]  Office Phone: [phone]  Office Fax: [phone]

## 2019-03-01 NOTE — [employer] PATIENT INSTRUCTIONS
Discharge Summary/Instructions after an Endoscopic Procedure  Patient Name: Saima Dunn  Patient MRN: [id number]  Patient YOB: 1945  Thursday, February 28, 2019  Danny Greco MD  RESTRICTIONS:  During your procedure today, you received medications for sedation.  These   medications may affect your judgment, balance and coordination.  Therefore,   for 24 hours, you have the following restrictions:   - DO NOT drive a car, operate machinery, make legal/financial decisions,   sign important papers or drink alcohol.    ACTIVITY:  Today: no heavy lifting, straining or running due to procedural   sedation/anesthesia.  The following day: return to full activity including work.  DIET:  Eat and drink normally unless instructed otherwise.     TREATMENT FOR COMMON SIDE EFFECTS:  - Mild abdominal pain, nausea, belching, bloating or excessive gas:  rest,   eat lightly and use a heating pad.  - Sore Throat: treat with throat lozenges and/or gargle with warm salt   water.  - Because air was used during the procedure, expelling large amounts of air   from your rectum or belching is normal.  - If a bowel prep was taken, you may not have a bowel movement for 1-3 days.    This is normal.  SYMPTOMS TO WATCH FOR AND REPORT TO YOUR PHYSICIAN:  1. Abdominal pain or bloating, other than gas cramps.  2. Chest pain.  3. Back pain.  4. Signs of infection such as: chills or fever occurring within 24 hours   after the procedure.  5. Rectal bleeding, which would show as bright red, maroon, or black stools.   (A tablespoon of blood from the rectum is not serious, especially if   hemorrhoids are present.)  6. Vomiting.  7. Weakness or dizziness.  GO DIRECTLY TO THE NEAREST EMERGENCY ROOM IF YOU HAVE ANY OF THE FOLLOWING:      Difficulty breathing              Chills and/or fever over 101 F   Persistent vomiting and/or vomiting blood   Severe abdominal pain   Severe chest pain   Black, tarry stools   Bleeding- more than one  tablespoon   Any other symptom or condition that you feel may need urgent attention  Your doctor recommends these additional instructions:  If any biopsies were taken, your doctors clinic will contact you in 1 to 2   weeks with any results.  - Return patient to hospital [facility] for ongoing care.   - Await path results.  For questions, problems or results please call your physician - Danny Greco MD at Work:  [phone].  EMERGENCY PHONE NUMBER: [phone],  LAB RESULTS: [phone]  IF A COMPLICATION OR EMERGENCY SITUATION ARISES AND YOU ARE UNABLE TO REACH   YOUR PHYSICIAN - GO DIRECTLY TO THE EMERGENCY ROOM.  Danny Greco MD  2/28/2019 6:04:47 PM  This report has been verified and signed electronically.  [employer]

## 2019-03-01 NOTE — NURSING
Pt given D/C, F/U and RX information. Verbalized understanding. Denied questions. PIV and telemetry removed. NAD noted.

## 2019-03-01 NOTE — ANESTHESIA POSTPROCEDURE EVALUATION
"Anesthesia Post Evaluation    Patient: Saima Dunn    Procedure(s) Performed: Procedure(s) (LRB):  ULTRASOUND, UPPER GI TRACT, ENDOSCOPIC (N/A)    Final Anesthesia Type: MAC  Patient location during evaluation: GI PACU  Patient participation: Yes- Able to Participate  Level of consciousness: awake and alert and oriented  Post-procedure vital signs: reviewed and stable  Pain management: adequate  Airway patency: patent  PONV status at discharge: No PONV  Anesthetic complications: no      Cardiovascular status: stable, hemodynamically stable and blood pressure returned to baseline  Respiratory status: room air, unassisted and spontaneous ventilation  Follow-up not needed.        Visit Vitals  BP (!) 101/50 (Patient Position: Lying)   Pulse 96   Temp 37.1 °C (98.8 °F)   Resp 18   Ht 4' 10" (1.473 m)   Wt 52 kg (114 lb 10.2 oz)   LMP 06/01/1966   SpO2 99%   Breastfeeding? No   BMI 23.96 kg/m²       Pain/Anjum Score: Anjum Score: 8 (2/28/2019  5:59 PM)        "

## 2019-03-01 NOTE — MEDICAL/APP STUDENT
"[facility] Medicine  Progress Note      Patient Name: Saima Dunn  MRN: [id number]  Admission Date: 2/26/2019  Hospital Length of Stay: 0 days  Code Status: Full Code   Admitting Physician: Axel Cortez MD   Primary Care Physician: Thiago Romero MD  Principal Problem:GI bleed     Subjective:     HPI: Ms Dunn is a 72 YO smoker (quarter pk/dy) with a history of COPD, HCV (Dx'ed Jan 2017, suspected as a complication of her pregnancy prior to HCV screening of blood products), controlled HTN, and HLD who presented to [facility] ED the evening of 2/27 with onset at 1600 following a nap of 1 day of hematochezia associated with 1 episode of possible hematemesis described as "coffee with cream-colored" of large volume (described as several cups), generalized weakness, diaphoresis, and dyspnea. Denies any history of these symptoms. Denies any abdominal pain, diarrhea, appetite changes, chest pain, palpitations, or pre/syncope. Notes prior colonoscopy for rectal bleeding in 2018, which was unremarkable (said she was told it was likely 2/2 hemorrhoids), and yearly mammograms that were normal.      Hospital course:  2/26: Presented to [facility] ED and evaluated. H&H low but stable at 13/39 with BUN 32 without creatinine elevation, FOBT positive, normal PT/INR. O/W BMP and liver panel unremarkable. Patient hemodynamically stable with no active bleeding noted on initial H&P. Given 1L NSL. Admitted to CDU for further evaluation. Given single dose Protonix 80 mg IV. Counsoled regarding smoking cessation, to which patient agrees.     2/27: Now on 40 mg IV Protonix BID. Patient denies any further hematemesis or melenic episodes. Repeat H&H 11.6/34 today. Patient seen by Dr. Joshi of Gastro team, who scheduled patient for EGD: revealed a large, pedunculated mass in the duodenal bulb with ulceration, consistent with patient's GI bleed presentation. GI plans for EUS for further lesion " "characterization 2/28.    2/28: EUS done for further characterization of GI mass, including biopsy. Report suggests may be a lipoma given its location, US characteristics. General surgery are following, are awaiting pathology report in 1 week. Patient likely to be discharged tomorrow as future procedures can be done as outpatient    Interval history: Patient endorses 2 mild episodes of melena described as tarry black stools associated with rectal bleeding described as "clots", with total volume less than 1/4 of a coffee cup. Denies any N/V/D or abdominal pain, denies lightheadedness or unusual fatigue. Chronic cough from her COPD is unchanged from baseline per patient.    Past Medical History:   Diagnosis Date    Hepatitis C     Hyperlipidemia     Hypertension        Past Surgical History:   Procedure Laterality Date    COLONOSCOPY miralax split dose prep N/A 12/7/2016    Performed by Zhang [facility] Jr[facility] at [facility]    ESOPHAGOGASTRODUODENOSCOPY (EGD) N/A 2/27/2019    Performed by Magui Bradley MD at [facility]    HYSTERECTOMY  1967    ULTRASOUND, UPPER GI TRACT, ENDOSCOPIC N/A 2/28/2019    Performed by Danny Greco MD at [facility]       Family History   Problem Relation Age of Onset    Ovarian cancer Mother     Ovarian cancer Sister        Social History     Socioeconomic History    Marital status:      Spouse name: None    Number of children: None    Years of education: None    Highest education level: None   Social Needs    Financial resource strain: None    Food insecurity - worry: None    Food insecurity - inability: None    Transportation needs - medical: None    Transportation needs - non-medical: None   Occupational History    None   Tobacco Use    Smoking status: Current Every Day Smoker     Packs/day: 0.25    Tobacco comment: Enrolled in [employer]   Substance and Sexual Activity    Alcohol use: None    Drug use: None    Sexual activity: None "   Other Topics Concern    None   Social History Narrative    None       Current Facility-Administered Medications   Medication Dose Route Frequency Provider Last Rate Last Dose    acetaminophen tablet 650 mg  650 mg Oral Q4H PRN Maria E Martinez NP        diphenhydrAMINE injection 25 mg  25 mg Intravenous Q6H PRN Zhang Dupont MD        hydromorphone (PF) injection 0.2 mg  0.2 mg Intravenous Q5 Min PRN Zhnag Dupont MD        hydromorphone (PF) injection 0.5 mg  0.5 mg Intravenous Q5 Min PRN Zhang Dupont MD        losartan tablet 100 mg  100 mg Oral Daily Kelley Woodward PA-C   100 mg at 03/01/19 0810    ondansetron injection 4 mg  4 mg Intravenous Q8H PRN Maria E Martinez NP        oxyCODONE-acetaminophen 5-325 mg per tablet 1 tablet  1 tablet Oral Q3H PRN Zhang Dupont MD        pantoprazole injection 40 mg  40 mg Intravenous BID Maria E Martinez NP   40 mg at 03/01/19 0810    promethazine (PHENERGAN) 6.25 mg in dextrose 5 % 50 mL IVPB  6.25 mg Intravenous Q10 Min PRN Zhang Dupont MD        sodium chloride 0.9% flush 5 mL  5 mL Intravenous PRN Maria E Martinez NP           Review of patient's allergies indicates:   Allergen Reactions    Codeine      Review of Systems   Constitutional: Negative for diaphoresis and malaise/fatigue.   Respiratory: Positive for cough. Negative for hemoptysis, sputum production and shortness of breath.    Cardiovascular: Negative for chest pain, palpitations and leg swelling.   Gastrointestinal: Positive for blood in stool and melena. Negative for abdominal pain, diarrhea, heartburn, nausea and vomiting.   Genitourinary: Negative for dysuria and urgency.   Musculoskeletal: Negative for falls and myalgias.   Neurological: Negative for dizziness, loss of consciousness and weakness.     Objective    Vitals:    03/01/19 0537 03/01/19 0719 03/01/19 0725 03/01/19 0751   BP: (!) 113/56 (!) 120/58     BP Location: Left arm      Patient  Position: Lying Sitting     Pulse: 67 66 70 64   Resp: 18 18 16    Temp: 98 °F (36.7 °C) 97.8 °F (36.6 °C)     TempSrc: Oral Oral     SpO2:  99% 99%    Weight:       Height:         Physical Exam   Constitutional: She is oriented to person, place, and time. She appears well-developed and well-nourished. No distress.   Eyes: Conjunctivae and EOM are normal.   Neck: Normal range of motion. Neck supple.   Cardiovascular: Normal rate, regular rhythm and intact distal pulses.   Midcystolic click as with previous exams   Pulmonary/Chest: Effort normal. No respiratory distress. She has wheezes. She has no rales.   Abdominal: Soft. She exhibits no distension and no mass. There is no tenderness. There is no rebound and no guarding.   Musculoskeletal: Normal range of motion. She exhibits no tenderness.   Neurological: She is alert and oriented to person, place, and time.   Skin: Skin is warm and dry. Capillary refill takes less than 2 seconds.     Recent Lab Results       02/28/19  1434        Baso # 0.02     Basophil% 0.3     Differential Method Automated     Eos # 0.2     Eosinophil% 3.4     Gran # (ANC) 3.8     Gran% 59.0     Hematocrit 32.9     Hemoglobin 11.5     Lymph # 1.9     Lymph% 28.6     MCH 32.1     MCHC 35.0     MCV 92     Mono # 0.5     Mono% 8.4     MPV 9.6     Platelets 155     RBC 3.58     RDW 12.9     WBC 6.46         Relevant Imaging:    EGD report 2/27 (per GI): Duodenal mass that looks like a GIST tumor but is pedunculated, which is unusual. There is ulceration in the surface of the lesion with a flat red spot.This is the likely source for bleeding.    EUS report 2/28 (per GI): An intramural (subepithelial) lesion was found in the apex of the duodenal bulb. The lesion appeared to originate from within the submucosa (Layer 3). Tissue was obtained from this exam, and results are pending. However, the endosonographic appearance is highly suspicious for a lipoma. Fine needle aspiration  performed.    Assessment and Plan:  Principal Problem: GI bleed, stable  - This is a 73 year old smoker who presented 2/27 with a single episode of hematemesis associated with hematochezia that afternoon, with prior colonoscopy 2 years ago normal. No other complaints  -  Hb 13.4 on admission now 11.4 and stable, Creat 0.8 stable, BUN 13  - VSS, hemodynamically stable, no IVF requirement  - EUS yesterday suggestive of lipoma in contrast to initial EGD that suggested GIST: lesion biopsied and specimen sent for pathological analysis, report to follow in approx 1 week.  - General Surgery are aware of these findings, requesting Contrast CT Chest + ABD/PLV, but do NOT plan on urgent surgery at this time. These can likely be performed on an outpatient basis.  - Pantoprazole 40 mg IV BID. Recommending 40 mg PO Protonix for discharge for ulcer prophylaxis.  - Patient endorsed 2 small episodes of melena + hematochezia of small volume overnight, but denies any lightheadedness, dyspnea, chest pain, palpitations, dizziness, or pre/syncope events. I believe these episodes may be likely related to her original bleeding episode resulting in her admission rather than a new bleed, as she describes these stools as more melanic than bright red blood.  - In light of stable H&H, normal BUN/Cr, normal BP and HR, no further episodes of hematemesis, no signs of distress, I believe patient is stable for discharge to F/U with her PCP, and await the pathology report for any further decision regarding her as-of-yet-unspecified GI mass.    Anemia  - Normocytic, likely 2/2 blood loss as above  - Stable as above     Chronic Hepatitis C  - Stable per my last progress note  - to be treated as outpatient, with inquiry regarding HBV/HAV testing/vaccination as outpatient matter     Hyperlipidemia  - Continue home 20 mg simvastatin PO     Arthritis  - Chronic per patient, unchanged from today, worse in AM on waking, better sleeping on her side.  - NO  NSAIDS per GI.     HTN  - -100 over last 12 hours, stable  - Continue home losartan 50 mg daily.     COPD:  - Smoking cessation consult as previously, patient wishes to give up smoking.  - No signs of respiratory distress, patient comfortably satting in high 90s on RA, no oxygenation requirements    [facility] Medicine: Evangelist Paredes MSY3 - Dr. Cortez attending

## 2019-03-01 NOTE — PROGRESS NOTES
[facility]  Gastroenterology  Progress Note    Patient Name: Saima Dunn  MRN: [id number]  Admission Date: 2/26/2019  Hospital Length of Stay: 0 days  Code Status: Full Code   Attending Provider: Axel Cortez MD  Consulting Provider: Nilda Joshi MD  Primary Care Physician: DALLAS Salvador  Principal Problem: GI bleed      Subjective:     Interval History: Doing well, no overt bleeding.     Review of Systems   Constitutional: Negative for chills and unexpected weight change.   Respiratory: Negative for apnea and chest tightness.    Cardiovascular: Negative for chest pain and palpitations.   Gastrointestinal: Negative for abdominal distention and abdominal pain.     Objective:     Vital Signs (Most Recent):  Temp: 97.8 °F (36.6 °C) (03/01/19 0719)  Pulse: 64 (03/01/19 0751)  Resp: 16 (03/01/19 0725)  BP: (!) 120/58 (03/01/19 0719)  SpO2: 99 % (03/01/19 0725) Vital Signs (24h Range):  Temp:  [97.7 °F (36.5 °C)-98.8 °F (37.1 °C)] 97.8 °F (36.6 °C)  Pulse:  [64-96] 64  Resp:  [3-20] 16  SpO2:  [94 %-99 %] 99 %  BP: (101-127)/(47-62) 120/58     Weight: 52 kg (114 lb 10.2 oz) (02/27/19 0300)  Body mass index is 23.96 kg/m².      Intake/Output Summary (Last 24 hours) at 3/1/2019 0911  Last data filed at 2/28/2019 1750  Gross per 24 hour   Intake 300 ml   Output --   Net 300 ml       Lines/Drains/Airways     Airway                 Airway - Non-Surgical 02/27/19 1553 Nasal Cannula 1 day         Airway - Non-Surgical 02/28/19 1647 Nasal Cannula less than 1 day          Peripheral Intravenous Line                 Peripheral IV - Single Lumen 02/26/19 2152 Left Antecubital 2 days         Peripheral IV - Single Lumen 02/26/19 2235 Right Forearm 2 days                Physical Exam   Constitutional: She is oriented to person, place, and time. She appears well-developed and well-nourished. No distress.   HENT:   Head: Normocephalic and atraumatic.   Eyes: Conjunctivae are normal. No  scleral icterus.   Neck: Normal range of motion. Neck supple. No tracheal deviation present. No thyromegaly present.   Cardiovascular: Normal rate and regular rhythm. Exam reveals no gallop and no friction rub.   No murmur heard.  Pulmonary/Chest: Effort normal and breath sounds normal. No respiratory distress. She has no wheezes.   Abdominal: Soft. Bowel sounds are normal. She exhibits no distension. There is no tenderness.   Neurological: She is alert and oriented to person, place, and time.   Skin: Skin is warm and dry. No rash noted. She is not diaphoretic. No erythema.   Psychiatric: She has a normal mood and affect. Her behavior is normal.   Nursing note and vitals reviewed.      Significant Labs:  CBC:   Recent Labs   Lab 02/28/19  1434   WBC 6.46   HGB 11.5*   HCT 32.9*            Significant Imaging:  Imaging results within the past 24 hours have been reviewed.    Assessment/Plan:     * GI bleed    - doing well  - s/p egd, results reviewed  - will f/u in clinic to discuss hcv treatment and f/u pathology  - ok to d/c from my standpoint     Hepatitis C    - sent fibrosure, hcv genotype and pcr  - plan outpt treatment         Thank you for your consult. I will follow-up with patient. Please contact us if you have any additional questions.    Nilda Joshi MD  Gastroenterology  [facility]

## 2019-03-01 NOTE — SUBJECTIVE & OBJECTIVE
Subjective:     Interval History: Doing well, no overt bleeding.     Review of Systems   Constitutional: Negative for chills and unexpected weight change.   Respiratory: Negative for apnea and chest tightness.    Cardiovascular: Negative for chest pain and palpitations.   Gastrointestinal: Negative for abdominal distention and abdominal pain.     Objective:     Vital Signs (Most Recent):  Temp: 97.8 °F (36.6 °C) (03/01/19 0719)  Pulse: 64 (03/01/19 0751)  Resp: 16 (03/01/19 0725)  BP: (!) 120/58 (03/01/19 0719)  SpO2: 99 % (03/01/19 0725) Vital Signs (24h Range):  Temp:  [97.7 °F (36.5 °C)-98.8 °F (37.1 °C)] 97.8 °F (36.6 °C)  Pulse:  [64-96] 64  Resp:  [3-20] 16  SpO2:  [94 %-99 %] 99 %  BP: (101-127)/(47-62) 120/58     Weight: 52 kg (114 lb 10.2 oz) (02/27/19 0300)  Body mass index is 23.96 kg/m².      Intake/Output Summary (Last 24 hours) at 3/1/2019 0911  Last data filed at 2/28/2019 1750  Gross per 24 hour   Intake 300 ml   Output --   Net 300 ml       Lines/Drains/Airways     Airway                 Airway - Non-Surgical 02/27/19 1553 Nasal Cannula 1 day         Airway - Non-Surgical 02/28/19 1647 Nasal Cannula less than 1 day          Peripheral Intravenous Line                 Peripheral IV - Single Lumen 02/26/19 2152 Left Antecubital 2 days         Peripheral IV - Single Lumen 02/26/19 2235 Right Forearm 2 days                Physical Exam   Constitutional: She is oriented to person, place, and time. She appears well-developed and well-nourished. No distress.   HENT:   Head: Normocephalic and atraumatic.   Eyes: Conjunctivae are normal. No scleral icterus.   Neck: Normal range of motion. Neck supple. No tracheal deviation present. No thyromegaly present.   Cardiovascular: Normal rate and regular rhythm. Exam reveals no gallop and no friction rub.   No murmur heard.  Pulmonary/Chest: Effort normal and breath sounds normal. No respiratory distress. She has no wheezes.   Abdominal: Soft. Bowel sounds are  normal. She exhibits no distension. There is no tenderness.   Neurological: She is alert and oriented to person, place, and time.   Skin: Skin is warm and dry. No rash noted. She is not diaphoretic. No erythema.   Psychiatric: She has a normal mood and affect. Her behavior is normal.   Nursing note and vitals reviewed.      Significant Labs:  CBC:   Recent Labs   Lab 02/28/19  1434   WBC 6.46   HGB 11.5*   HCT 32.9*            Significant Imaging:  Imaging results within the past 24 hours have been reviewed.

## 2019-03-01 NOTE — ASSESSMENT & PLAN NOTE
- doing well  - s/p egd, results reviewed  - will f/u in clinic to discuss hcv treatment and f/u pathology  - ok to d/c from my standpoint

## 2019-03-04 LAB
A2 MACROGLOB SERPL-MCNC: 249 MG/DL
ALT SERPL W P-5'-P-CCNC: 23 U/L (ref 7–45)
APO A-I SERPL-MCNC: 136 MG/DL
BILIRUB SERPL-MCNC: 0.4 MG/DL
BIOPREDICTIVE SERIAL NUMBER: ABNORMAL
FIBROSIS STAGE SERPL QL: ABNORMAL
FIBROTEST INTERPRETATION: ABNORMAL
FIBROTEST-ACTITEST COMMENT: ABNORMAL
GGT SERPL-CCNC: 16 U/L
HAPTOGLOB SERPL-MCNC: 144 MG/DL
HCV GENTYP SERPL NAA+PROBE: ABNORMAL
HCV RNA SERPL NAA+PROBE-LOG IU: 6.42 LOG (10) IU/ML
HCV RNA SERPL QL NAA+PROBE: DETECTED
HCV RNA SPEC NAA+PROBE-ACNC: ABNORMAL IU/ML
LIVER FIBR SCORE SERPL CALC.FIBROSURE: 0.31
NECROINFLAMMAT INTERP: ABNORMAL
NECROINFLAMMATORY ACT GRADE SERPL QL: ABNORMAL
NECROINFLAMMATORY ACT SCORE SERPL: 0.1

## 2019-03-07 ENCOUNTER — TELEPHONE (OUTPATIENT)
Dept: GASTROENTEROLOGY | Facility: CLINIC | Age: 74
End: 2019-03-07

## 2019-03-07 DIAGNOSIS — B18.2 CHRONIC HEPATITIS C WITHOUT HEPATIC COMA: Primary | ICD-10-CM

## 2019-03-07 DIAGNOSIS — Z11.4 ENCOUNTER FOR SCREENING FOR HIV: ICD-10-CM

## 2019-03-07 NOTE — TELEPHONE ENCOUNTER
----- Message from Nilda Joshi MD sent at 3/7/2019 10:25 AM CST -----  HCV is positive, no evidence of cirrhosis from other labs. Will get her set up for treatment

## 2019-03-14 ENCOUNTER — TELEPHONE (OUTPATIENT)
Dept: ENDOSCOPY | Facility: HOSPITAL | Age: 74
End: 2019-03-14

## 2019-03-14 DIAGNOSIS — K31.89 DUODENAL NODULE: Primary | ICD-10-CM

## 2019-03-14 NOTE — TELEPHONE ENCOUNTER
----- Message from Danny Greco MD sent at 3/14/2019  2:00 PM CDT -----  Please let the patient know the FNA of the duodenal lesion did not showed evidence of cancer. Given the duodenal lesion was ulcerated I will recommend a follow up EGD/EUS in 1 month.

## 2019-03-15 ENCOUNTER — OFFICE VISIT (OUTPATIENT)
Dept: SURGERY | Facility: CLINIC | Age: 74
End: 2019-03-15
Payer: MEDICARE

## 2019-03-15 VITALS
TEMPERATURE: 97 F | DIASTOLIC BLOOD PRESSURE: 69 MMHG | WEIGHT: 138.75 LBS | HEIGHT: 58 IN | SYSTOLIC BLOOD PRESSURE: 122 MMHG | BODY MASS INDEX: 29.13 KG/M2 | HEART RATE: 70 BPM

## 2019-03-15 DIAGNOSIS — K31.89 DUODENAL MASS: Primary | ICD-10-CM

## 2019-03-15 PROCEDURE — 99999 PR PBB SHADOW E&M-EST. PATIENT-LVL III: ICD-10-PCS | Mod: PBBFAC,,, | Performed by: STUDENT IN AN ORGANIZED HEALTH CARE EDUCATION/TRAINING PROGRAM

## 2019-03-15 PROCEDURE — 99213 OFFICE O/P EST LOW 20 MIN: CPT | Mod: S$GLB,,, | Performed by: STUDENT IN AN ORGANIZED HEALTH CARE EDUCATION/TRAINING PROGRAM

## 2019-03-15 PROCEDURE — 1101F PR PT FALLS ASSESS DOC 0-1 FALLS W/OUT INJ PAST YR: ICD-10-PCS | Mod: CPTII,S$GLB,, | Performed by: STUDENT IN AN ORGANIZED HEALTH CARE EDUCATION/TRAINING PROGRAM

## 2019-03-15 PROCEDURE — 99213 PR OFFICE/OUTPT VISIT, EST, LEVL III, 20-29 MIN: ICD-10-PCS | Mod: S$GLB,,, | Performed by: STUDENT IN AN ORGANIZED HEALTH CARE EDUCATION/TRAINING PROGRAM

## 2019-03-15 PROCEDURE — 99999 PR PBB SHADOW E&M-EST. PATIENT-LVL III: CPT | Mod: PBBFAC,,, | Performed by: STUDENT IN AN ORGANIZED HEALTH CARE EDUCATION/TRAINING PROGRAM

## 2019-03-15 PROCEDURE — 1101F PT FALLS ASSESS-DOCD LE1/YR: CPT | Mod: CPTII,S$GLB,, | Performed by: STUDENT IN AN ORGANIZED HEALTH CARE EDUCATION/TRAINING PROGRAM

## 2019-03-15 RX ORDER — FLUTICASONE PROPIONATE 50 MCG
1 SPRAY, SUSPENSION (ML) NASAL DAILY
COMMUNITY
Start: 2019-03-14

## 2019-03-15 RX ORDER — ALBUTEROL SULFATE 5 MG/ML
2.5 SOLUTION RESPIRATORY (INHALATION) EVERY 6 HOURS PRN
COMMUNITY
Start: 2019-03-14

## 2019-03-15 NOTE — LETTER
March 18, 2019      Nilda Joshi MD  [address]  [address]  [place] [address]           [facility]  [address]  [address]  [place] [address]  Phone: [phone]          Patient: Saima Dunn   MR Number: [id number]   YOB: 1945   Date of Visit: 3/15/2019       Dear Dr. Nilda Joshi:    Thank you for referring Saima Dunn to me for evaluation. Attached you will find relevant portions of my assessment and plan of care.    If you have questions, please do not hesitate to call me. I look forward to following Saima Dunn along with you.    Sincerely,    Denny Vazquez MD    Enclosure  CC:  No Recipients    If you would like to receive this communication electronically, please contact [email or web address] or [phone] to request more information on [employer] access.    For providers and/or their staff who would like to refer a patient to [facility], please contact us through our one-stop-shop provider referral line, [facility], at [phone].    If you feel you have received this communication in error or would no longer like to receive these types of communications, please e-mail [email or web address]

## 2019-03-18 NOTE — PROGRESS NOTES
Patient ID: Saima Dunn is a 73 y.o. female.    Chief Complaint: No chief complaint on file.      HPI:  73F here for follow up from when she presented to the hospital with [facility] for 2 days. She was admitted to the medicine service and GI was consulted. She has never had this before. Denies any pain, dizziness. She had some nausea earlier. GI performed an EGD yesterday which showed a large submucosal mass in the duodenum with central necrosis. Her HH is 11 down from 13 upon presentation.     Today she feels well. She had a colonoscopy 2 years ago that was unremarkable. She does have COPD and occasionally uses an inhaler. Smoked 1ppd for 50 years, still smoking. Denies WELCH, CP.  Hemodynamically stable throughout, no blood transfusions. Bascially no complaints today here in the office. On protonix.        Review of Systems   Constitutional: Negative for fever.   HENT: Negative for trouble swallowing.    Respiratory: Negative for shortness of breath.    Cardiovascular: Negative for chest pain.   Gastrointestinal: Negative for abdominal pain, blood in stool, nausea and vomiting.   Genitourinary: Negative for dysuria.   Musculoskeletal: Negative for gait problem.   Skin: Negative for rash and wound.   Allergic/Immunologic: Negative for immunocompromised state.   Neurological: Negative for weakness and light-headedness.   Hematological: Does not bruise/bleed easily.   Psychiatric/Behavioral: Negative for agitation.       Current Outpatient Medications   Medication Sig Dispense Refill    albuterol (PROVENTIL) 5 mg/mL nebulizer solution Take 2.5 mg by nebulization every 6 (six) hours as needed for Wheezing. Rescue      BABY ASPIRIN ORAL Take 81 mg by mouth once daily.      fluticasone (FLONASE) 50 mcg/actuation nasal spray 1 spray by Each Nare route once daily.      gabapentin (NEURONTIN) 100 MG capsule Take 100 mg by mouth 3 (three) times daily.      losartan (COZAAR) 50 MG tablet Take 100 mg by mouth once  daily.       pantoprazole (PROTONIX) 40 MG tablet Take 1 tablet (40 mg total) by mouth once daily. 30 tablet 2    simvastatin (ZOCOR) 20 MG tablet Take 20 mg by mouth every other day.        No current facility-administered medications for this visit.        Review of patient's allergies indicates:   Allergen Reactions    Codeine        Past Medical History:   Diagnosis Date    Hepatitis C     Hyperlipidemia     Hypertension        Past Surgical History:   Procedure Laterality Date    COLONOSCOPY miralax split dose prep N/A 12/7/2016    Performed by Zhang [facility] at [facility]    ESOPHAGOGASTRODUODENOSCOPY (EGD) N/A 2/27/2019    Performed by Magui Bradley MD at [facility]    HYSTERECTOMY  1967    ULTRASOUND, UPPER GI TRACT, ENDOSCOPIC N/A 2/28/2019    Performed by Danny Greco MD at [facility]   Lap damon at [facility] 5 years ago    Family History   Problem Relation Age of Onset    Ovarian cancer Mother     Ovarian cancer Sister        Social History     Socioeconomic History    Marital status:      Spouse name: Not on file    Number of children: Not on file    Years of education: Not on file    Highest education level: Not on file   Social Needs    Financial resource strain: Not on file    Food insecurity - worry: Not on file    Food insecurity - inability: Not on file    Transportation needs - medical: Not on file    Transportation needs - non-medical: Not on file   Occupational History    Not on file   Tobacco Use    Smoking status: Current Every Day Smoker     Packs/day: 0.25    Tobacco comment: Enrolled in [employer]   Substance and Sexual Activity    Alcohol use: Not on file    Drug use: Not on file    Sexual activity: Not on file   Other Topics Concern    Not on file   Social History Narrative    Not on file       Vitals:    03/15/19 0950   BP: 122/69   Pulse: 70   Temp: 96.9 °F (36.1 °C)       Physical Exam   Constitutional: She is oriented to  person, place, and time. She appears well-developed and well-nourished. No distress.   HENT:   Head: Normocephalic and atraumatic.   Eyes: No scleral icterus.   Cardiovascular: Normal rate.   Pulmonary/Chest: Effort normal. No stridor.   Abdominal: Soft. She exhibits no distension. There is no tenderness.   Lymphadenopathy:     She has no cervical adenopathy.   Neurological: She is alert and oriented to person, place, and time.   Skin: Skin is warm. No erythema.   Psychiatric: She has a normal mood and affect. Her behavior is normal.     EGD - large submucosal mass just proximal to ampulla in duodenum, stigmata of recent bleeding  Hg 11  Cr 0.8  GFR >60  Pathology showed normal mucosa  EUS possible lipoma    Assessment & Plan:   73F with duodenal mass, possible lipoma?   Continue PPI  Scheduled for repeat EGD 3/28. Would need repeat biopsy

## 2019-04-02 ENCOUNTER — TELEPHONE (OUTPATIENT)
Dept: GASTROENTEROLOGY | Facility: CLINIC | Age: 74
End: 2019-04-02

## 2019-04-02 NOTE — TELEPHONE ENCOUNTER
Your Upper EUS is scheduled for 4/17/2019 with Dr. Greco        At [facility] which is located at [address] [place] [address].  You will check in at the Hospital Admit Desk located on the 1st floor of the hospital. Please call the the office if you have any questions at [phone]      Upper Endoscopic Ultrasound    Endoscopic ultrasound(EUS) is a procedure used to image the digestive tract, including pancreas, lesions in esophagus and stomach.  It is used to diagnose and stage cancers of the digestive tract.  If necessary, your doctor may need to take samples during the procedure.    A responsible adult (family member or friend) must come with you and transport you home.  You are not allowed to drive, take a taxi or bus or leave the [facility] Center alone.  If you do not have a responsible adult with you to take you home, your exam will be cancelled.     If you have questions about the cost of your procedure, you should contact your insurance company as soon as possible.  Please bring a picture ID and your insurance card.  You will sign  treatment authorization forms at check in.  It is necessary for you to sign these forms again even if you recently signed these at the time of your clinic visit.    Please follow instructions of  if you are taking anticoagulant/blood thinning medications such as Aggrenox, aspirin, Brilinta, Effient, Eliquis, Lovenox, Plavix, Pletal, Pradaxa, Ticilid, Xarelto or Coumadin.     Take blood pressure, heart, anti-rejection and or seizure medication at 600 am the morning of the procedure.    Skip your morning dose of insulin or other oral medications for diabetes the morning of the procedure unless instructed otherwise by your doctor.      Day Before The Procedure    Eat a light evening meal and eat no solid food after 7 pm.  You may drink clear liquids including:    Water, Coffee or decaffeinated coffee (no milk or cream)  Tea, Herbal tea  Carbonated beverages  (soft drinks), regular and sugar free  Gelatin  Apple Juice, grape juice, white cranberry juice  Gatorade, Power Aid, Crystal Light, Gustavo Aid  Lemonade and Limeade  Bouillon, clear consomme'  Snowball, popsicles  DO NOT DRINK ANY LIQUIDS CONTAINING RED DYE  DO NOT DRINK ANY LIQUIDS NOT SPECIFICALLY LISTED  DO NOT DRINK ALCOHOL  NOTHING BY MOUTH AFTER MIDNIGHT    Day of Procedure    600 am take last dose of any medications you are allowed to take with a small amount of clear liquid

## 2019-04-15 ENCOUNTER — TELEPHONE (OUTPATIENT)
Dept: ENDOSCOPY | Facility: HOSPITAL | Age: 74
End: 2019-04-15

## 2019-04-15 NOTE — TELEPHONE ENCOUNTER
Spoke with patient about arrival time @ 1100*.     NPO status reviewed: Patient may eat until 7:00pm.  After 7pm, pt may have CLEAR liquids ONLY until completely NPO at Midnight.    Medications: Do not take Insulin or oral diabetic medications the day of the procedure.    Take as prescribed: heart, seizure and blood pressure medication in the morning with a sip of water (less than an ounce).  Take any breathing medications and bring inhalers to hospital with you.     Leave all valuables and jewelry at home. Wear comfortable clothes to procedure to change into hospital gown.   You cannot drive for 24 hours after your procedure because you will receive sedation for your procedure to make you comfortable.    A ride must be provided at discharge.

## 2019-04-17 ENCOUNTER — ANESTHESIA EVENT (OUTPATIENT)
Dept: ENDOSCOPY | Facility: HOSPITAL | Age: 74
End: 2019-04-17
Payer: MEDICARE

## 2019-04-17 ENCOUNTER — ANESTHESIA (OUTPATIENT)
Dept: ENDOSCOPY | Facility: HOSPITAL | Age: 74
End: 2019-04-17
Payer: MEDICARE

## 2019-04-17 ENCOUNTER — HOSPITAL ENCOUNTER (OUTPATIENT)
Facility: HOSPITAL | Age: 74
Discharge: HOME | End: 2019-04-17
Attending: INTERNAL MEDICINE | Admitting: INTERNAL MEDICINE
Payer: MEDICARE

## 2019-04-17 VITALS
TEMPERATURE: 99 F | HEART RATE: 69 BPM | OXYGEN SATURATION: 98 % | WEIGHT: 137 LBS | DIASTOLIC BLOOD PRESSURE: 63 MMHG | SYSTOLIC BLOOD PRESSURE: 125 MMHG | RESPIRATION RATE: 22 BRPM | BODY MASS INDEX: 28.76 KG/M2 | HEIGHT: 58 IN

## 2019-04-17 DIAGNOSIS — K31.89 DUODENAL NODULE: ICD-10-CM

## 2019-04-17 DIAGNOSIS — K31.89 DUODENAL MASS: Primary | ICD-10-CM

## 2019-04-17 PROCEDURE — 37000009 HC ANESTHESIA EA ADD 15 MINS: Performed by: INTERNAL MEDICINE

## 2019-04-17 PROCEDURE — 25000003 PHARM REV CODE 250: Performed by: INTERNAL MEDICINE

## 2019-04-17 PROCEDURE — 25000003 PHARM REV CODE 250: Performed by: NURSE ANESTHETIST, CERTIFIED REGISTERED

## 2019-04-17 PROCEDURE — 43235 EGD DIAGNOSTIC BRUSH WASH: CPT | Performed by: INTERNAL MEDICINE

## 2019-04-17 PROCEDURE — 37000008 HC ANESTHESIA 1ST 15 MINUTES: Performed by: INTERNAL MEDICINE

## 2019-04-17 PROCEDURE — 43259 PR ENDOSCOPIC ULTRASOUND EXAM: ICD-10-PCS | Mod: ,,, | Performed by: INTERNAL MEDICINE

## 2019-04-17 PROCEDURE — 63600175 PHARM REV CODE 636 W HCPCS: Performed by: NURSE ANESTHETIST, CERTIFIED REGISTERED

## 2019-04-17 PROCEDURE — 43259 EGD US EXAM DUODENUM/JEJUNUM: CPT | Performed by: INTERNAL MEDICINE

## 2019-04-17 PROCEDURE — 43259 EGD US EXAM DUODENUM/JEJUNUM: CPT | Mod: ,,, | Performed by: INTERNAL MEDICINE

## 2019-04-17 RX ORDER — FENTANYL CITRATE 50 UG/ML
INJECTION, SOLUTION INTRAMUSCULAR; INTRAVENOUS
Status: DISCONTINUED | OUTPATIENT
Start: 2019-04-17 | End: 2019-04-17

## 2019-04-17 RX ORDER — GLYCOPYRROLATE 0.2 MG/ML
INJECTION INTRAMUSCULAR; INTRAVENOUS
Status: DISCONTINUED | OUTPATIENT
Start: 2019-04-17 | End: 2019-04-17

## 2019-04-17 RX ORDER — SODIUM CHLORIDE 9 MG/ML
INJECTION, SOLUTION INTRAVENOUS CONTINUOUS
Status: DISCONTINUED | OUTPATIENT
Start: 2019-04-17 | End: 2019-04-17 | Stop reason: HOSPADM

## 2019-04-17 RX ORDER — PROPOFOL 10 MG/ML
VIAL (ML) INTRAVENOUS
Status: DISCONTINUED | OUTPATIENT
Start: 2019-04-17 | End: 2019-04-17

## 2019-04-17 RX ORDER — PROPOFOL 10 MG/ML
VIAL (ML) INTRAVENOUS CONTINUOUS PRN
Status: DISCONTINUED | OUTPATIENT
Start: 2019-04-17 | End: 2019-04-17

## 2019-04-17 RX ORDER — LIDOCAINE HCL/PF 100 MG/5ML
SYRINGE (ML) INTRAVENOUS
Status: DISCONTINUED | OUTPATIENT
Start: 2019-04-17 | End: 2019-04-17

## 2019-04-17 RX ORDER — SODIUM CHLORIDE 0.9 % (FLUSH) 0.9 %
10 SYRINGE (ML) INJECTION
Status: DISCONTINUED | OUTPATIENT
Start: 2019-04-17 | End: 2019-04-17 | Stop reason: HOSPADM

## 2019-04-17 RX ADMIN — LIDOCAINE HYDROCHLORIDE 50 MG: 20 INJECTION, SOLUTION INTRAVENOUS at 12:04

## 2019-04-17 RX ADMIN — FENTANYL CITRATE 50 MCG: 50 INJECTION, SOLUTION INTRAMUSCULAR; INTRAVENOUS at 12:04

## 2019-04-17 RX ADMIN — GLYCOPYRROLATE 0.2 MG: 0.2 INJECTION, SOLUTION INTRAMUSCULAR; INTRAVENOUS at 12:04

## 2019-04-17 RX ADMIN — PROPOFOL 125 MCG/KG/MIN: 10 INJECTION, EMULSION INTRAVENOUS at 12:04

## 2019-04-17 RX ADMIN — PROPOFOL 50 MG: 10 INJECTION, EMULSION INTRAVENOUS at 12:04

## 2019-04-17 RX ADMIN — SODIUM CHLORIDE: 0.9 INJECTION, SOLUTION INTRAVENOUS at 11:04

## 2019-04-17 NOTE — PLAN OF CARE
PIV discontinued with catheter intact. PIV insertion site clean, dry, and intact with no signs/symptoms of redness or swelling. Pressure dressing applied with gauze and coban. Instructed patient to keep dressing on for at least 20-30 minutes and she verbalized understanding. Patient ready for discharge.

## 2019-04-17 NOTE — H&P
History & Physical - Short Stay  Gastroenterology      SUBJECTIVE:     Procedure: EUS    Chief Complaint/Indication for Procedure: Duodenal lesion    History of Present Illness:  Patient is a 73 y.o. female presents with duodenal ulcerated submucosal lesion. EUS was suspicious for lipoma, however the ulceration was atypical. She is here for follow up.    PTA Medications   Medication Sig    BABY ASPIRIN ORAL Take 81 mg by mouth once daily.    gabapentin (NEURONTIN) 100 MG capsule Take 100 mg by mouth 3 (three) times daily.    losartan (COZAAR) 50 MG tablet Take 100 mg by mouth once daily.     pantoprazole (PROTONIX) 40 MG tablet Take 1 tablet (40 mg total) by mouth once daily.    simvastatin (ZOCOR) 20 MG tablet Take 20 mg by mouth every other day.     albuterol (PROVENTIL) 5 mg/mL nebulizer solution Take 2.5 mg by nebulization every 6 (six) hours as needed for Wheezing. Rescue    fluticasone (FLONASE) 50 mcg/actuation nasal spray 1 spray by Each Nare route once daily.       Review of patient's allergies indicates:   Allergen Reactions    Codeine         Past Medical History:   Diagnosis Date    COPD (chronic obstructive pulmonary disease)     Hepatitis C     Hyperlipidemia     Hypertension      Past Surgical History:   Procedure Laterality Date    CHOLECYSTECTOMY      COLONOSCOPY miralax split dose prep N/A 12/7/2016    Performed by Zhang Jorge Jr., MD at [facility]    ESOPHAGOGASTRODUODENOSCOPY (EGD) N/A 2/27/2019    Performed by Magui Bradley MD at [facility]    HYSTERECTOMY  1967    ULTRASOUND, UPPER GI TRACT, ENDOSCOPIC N/A 2/28/2019    Performed by Danny Greco MD at [facility]     Family History   Problem Relation Age of Onset    Ovarian cancer Mother     Ovarian cancer Sister      Social History     Tobacco Use    Smoking status: Current Every Day Smoker     Packs/day: 0.25    Smokeless tobacco: Never Used    Tobacco comment: Enrolled in [employer]    Substance Use Topics    Alcohol use: Never     Frequency: Never    Drug use: Not on file       Review of Systems:  Cardiovascular: no chest pain or palpitations  Gastrointestinal: no nausea or vomiting, no abdominal pain or change in bowel habits    OBJECTIVE:     Vital Signs (Most Recent)  Temp: 98.6 °F (37 °C) (04/17/19 1139)  Pulse: 70 (04/17/19 1139)  Resp: 18 (04/17/19 1139)  BP: (!) 113/57 (04/17/19 1139)  SpO2: 96 % (04/17/19 1139)    Physical Exam:  General: well developed, well nourished  Lungs:  normal respiratory effort and rhonchi anterior - bilateral  Heart: regular rate, S1, S2 normal  Abdomen: soft, non-tender non-distented; bowel sounds normal; no masses,  no organomegaly    Laboratory  CBC: No results for input(s): WBC, RBC, HGB, HCT, PLT, MCV, MCH, MCHC in the last 168 hours.  CMP: No results for input(s): GLU, CALCIUM, ALBUMIN, PROT, NA, K, CO2, CL, BUN, CREATININE, ALKPHOS, ALT, AST, BILITOT in the last 168 hours.  Coagulation: No results for input(s): LABPROT, INR, APTT in the last 168 hours.      Diagnostic Results:      ASSESSMENT/PLAN:     Duodenal lesion    Plan: EUS    Anesthesia Plan: MAC    ASA Grade: ASA 3 - Patient with moderate systemic disease with functional limitations     The impression and plan was discussed in detail with the patient and family. All questions have been answered and the patient voices understanding of our plan at this point. The risk of the procedure was discussed in detail which includes but not limited to bleeding, infection, perforation in some cases requiring surgery with its spectrum of complications.

## 2019-04-17 NOTE — TRANSFER OF CARE
"Anesthesia Transfer of Care Note    Patient: Saima Dunn    Procedure(s) Performed: Procedure(s) (LRB):  EGD (ESOPHAGOGASTRODUODENOSCOPY) (N/A)  ULTRASOUND, UPPER GI TRACT, ENDOSCOPIC (N/A)    Patient location: GI    Anesthesia Type: MAC    Transport from OR: Transported from OR on room air with adequate spontaneous ventilation    Post pain: adequate analgesia    Post assessment: no apparent anesthetic complications    Post vital signs: stable    Level of consciousness: awake    Nausea/Vomiting: no nausea/vomiting    Complications: none    Transfer of care protocol was followed      Last vitals:   Visit Vitals  BP (!) 113/57 (BP Location: Left arm, Patient Position: Lying)   Pulse 70   Temp 37 °C (98.6 °F) (Tympanic)   Resp 18   Ht 4' 10" (1.473 m)   Wt 62.1 kg (137 lb)   LMP 06/01/1966   SpO2 96%   Breastfeeding? No   BMI 28.63 kg/m²     "

## 2019-04-17 NOTE — DISCHARGE SUMMARY
Discharge Summary/Instructions after an Endoscopic Procedure    Patient Name: Saima Dunn  Patient MRN: [id number]  Patient YOB: 1945    Wednesday, April 17, 2019  Danny Greco MD    RESTRICTIONS:  During your procedure today, you received medications for sedation.  These medications may affect your judgment, balance and coordination.  Therefore, for 24 hours, you have the following restrictions:     - DO NOT drive a car, operate machinery, make legal/financial decisions, sign important papers or drink alcohol.      ACTIVITY:  Today: no heavy lifting, straining or running due to procedural sedation/anesthesia.  The following day: return to full activity including work.    DIET:  Eat and drink normally unless instructed otherwise.     TREATMENT FOR COMMON SIDE EFFECTS:  - Mild abdominal pain, nausea, belching, bloating or excessive gas:  rest, eat lightly and use a heating pad.  - Sore Throat: treat with throat lozenges and/or gargle with warm salt water.  - Because air was used during the procedure, expelling large amounts of air from your rectum or belching is normal.  - If a bowel prep was taken, you may not have a bowel movement for 1-3 days.  This is normal.      SYMPTOMS TO WATCH FOR AND REPORT TO YOUR PHYSICIAN:  1. Abdominal pain or bloating, other than gas cramps.  2. Chest pain.  3. Back pain.  4. Signs of infection such as: chills or fever occurring within 24 hours after the procedure.  5. Rectal bleeding, which would show as bright red, maroon, or black stools. (A tablespoon of blood from the rectum is not serious, especially if hemorrhoids are present.)  6. Vomiting.  7. Weakness or dizziness.      GO DIRECTLY TO THE NEAREST EMERGENCY ROOM IF YOU HAVE ANY OF THE FOLLOWING:     Difficulty breathing              Chills and/or fever over 101 F   Persistent vomiting and/or vomiting blood   Severe abdominal pain   Severe chest pain   Black, tarry stools   Bleeding- more than one  tablespoon   Any other symptom or condition that you feel may need urgent attention    Your doctor recommends these additional instructions:  If any biopsies were taken, your doctors clinic will contact you in 1 to 2 weeks with any results.    - Discharge patient to home (ambulatory).   - Observe patient's clinical course.   - Return to referring physician.    For questions, problems or results please call your physician - Danny Greco MD at Work:  [phone].    EMERGENCY PHONE NUMBER: [phone],  LAB RESULTS: [phone]    IF A COMPLICATION OR EMERGENCY SITUATION ARISES AND YOU ARE UNABLE TO REACH YOUR PHYSICIAN - GO DIRECTLY TO THE EMERGENCY ROOM.

## 2019-04-17 NOTE — ANESTHESIA POSTPROCEDURE EVALUATION
Anesthesia Post Evaluation    Patient: Saima Dunn    Procedure(s) Performed: Procedure(s) (LRB):  EGD (ESOPHAGOGASTRODUODENOSCOPY) (N/A)  ULTRASOUND, UPPER GI TRACT, ENDOSCOPIC (N/A)    Final Anesthesia Type: MAC  Patient location during evaluation: GI PACU  Patient participation: Yes- Able to Participate  Level of consciousness: awake and alert  Post-procedure vital signs: reviewed and stable  Pain management: adequate  Airway patency: patent  PONV status at discharge: No PONV  Anesthetic complications: no      Cardiovascular status: blood pressure returned to baseline and hemodynamically stable  Respiratory status: unassisted, spontaneous ventilation and room air  Hydration status: euvolemic  Follow-up not needed.          Vitals Value Taken Time   /57 4/17/2019 11:39 AM   Temp 37 °C (98.6 °F) 4/17/2019 11:39 AM   Pulse 70 4/17/2019 11:39 AM   Resp 18 4/17/2019 11:39 AM   SpO2 96 % 4/17/2019 11:39 AM         No case tracking events are documented in the log.      Pain/Anjum Score: No data recorded

## 2019-04-17 NOTE — [employer] PATIENT INSTRUCTIONS
Discharge Summary/Instructions after an Endoscopic Procedure  Patient Name: Saima Dunn  Patient MRN: [id number]  Patient YOB: 1945  Wednesday, April 17, 2019  Danny Greco MD  RESTRICTIONS:  During your procedure today, you received medications for sedation.  These   medications may affect your judgment, balance and coordination.  Therefore,   for 24 hours, you have the following restrictions:   - DO NOT drive a car, operate machinery, make legal/financial decisions,   sign important papers or drink alcohol.    ACTIVITY:  Today: no heavy lifting, straining or running due to procedural   sedation/anesthesia.  The following day: return to full activity including work.  DIET:  Eat and drink normally unless instructed otherwise.     TREATMENT FOR COMMON SIDE EFFECTS:  - Mild abdominal pain, nausea, belching, bloating or excessive gas:  rest,   eat lightly and use a heating pad.  - Sore Throat: treat with throat lozenges and/or gargle with warm salt   water.  - Because air was used during the procedure, expelling large amounts of air   from your rectum or belching is normal.  - If a bowel prep was taken, you may not have a bowel movement for 1-3 days.    This is normal.  SYMPTOMS TO WATCH FOR AND REPORT TO YOUR PHYSICIAN:  1. Abdominal pain or bloating, other than gas cramps.  2. Chest pain.  3. Back pain.  4. Signs of infection such as: chills or fever occurring within 24 hours   after the procedure.  5. Rectal bleeding, which would show as bright red, maroon, or black stools.   (A tablespoon of blood from the rectum is not serious, especially if   hemorrhoids are present.)  6. Vomiting.  7. Weakness or dizziness.  GO DIRECTLY TO THE NEAREST EMERGENCY ROOM IF YOU HAVE ANY OF THE FOLLOWING:      Difficulty breathing              Chills and/or fever over 101 F   Persistent vomiting and/or vomiting blood   Severe abdominal pain   Severe chest pain   Black, tarry stools   Bleeding- more than one  tablespoon   Any other symptom or condition that you feel may need urgent attention  Your doctor recommends these additional instructions:  If any biopsies were taken, your doctors clinic will contact you in 1 to 2   weeks with any results.  - Discharge patient to home (ambulatory).   - Observe patient's clinical course.   - Return to referring physician.  For questions, problems or results please call your physician - Danny Greco MD at Work:  [phone].  EMERGENCY PHONE NUMBER: [phone],  LAB RESULTS: [phone]  IF A COMPLICATION OR EMERGENCY SITUATION ARISES AND YOU ARE UNABLE TO REACH   YOUR PHYSICIAN - GO DIRECTLY TO THE EMERGENCY ROOM.  Danny Greco MD  4/17/2019 1:09:24 PM  This report has been verified and signed electronically.  [employer]

## 2019-04-17 NOTE — ANESTHESIA PREPROCEDURE EVALUATION
04/17/2019  Saima Dunn is a 73 y.o., female.    Anesthesia Evaluation    I have reviewed the Patient Summary Reports.    I have reviewed the Nursing Notes.   I have reviewed the Medications.     Review of Systems  Social:  Smoker, Alcohol Use    Hematology/Oncology:  Hematology Normal   Oncology Normal     Cardiovascular:   Exercise tolerance: good Hypertension    Pulmonary:   COPD Prn inhaled use 1-2 x/wk   Hepatic/GI:   Hepatitis, C        Physical Exam  General:  Well nourished    Airway/Jaw/Neck:  Airway Findings: Mouth Opening: Normal Tongue: Normal  General Airway Assessment: Adult  Mallampati: III  Improves to I with phonation.  TM Distance: Normal, at least 6 cm      Dental:  Dental Findings: In tact             Anesthesia Plan  Type of Anesthesia, risks & benefits discussed:  Anesthesia Type:  general, MAC  Patient's Preference:   Intra-op Monitoring Plan:   Intra-op Monitoring Plan Comments:   Post Op Pain Control Plan:   Post Op Pain Control Plan Comments:   Induction:   IV  Beta Blocker:  Patient is not currently on a Beta-Blocker (No further documentation required).       Informed Consent: Patient understands risks and agrees with Anesthesia plan.  Questions answered. Anesthesia consent signed with patient.  ASA Score: 2     Day of Surgery Review of History & Physical: I have interviewed and examined the patient. I have reviewed the patient's H&P dated:            Ready For Surgery From Anesthesia Perspective.

## 2019-04-17 NOTE — PLAN OF CARE
Recovery complete. Patient recovered to baseline. Discharge instructions reviewed with patient. VSS and no complaints of pain or discomfort noted. Patient ready for discharge.

## 2020-08-03 ENCOUNTER — HOSPITAL ENCOUNTER (EMERGENCY)
Facility: HOSPITAL | Age: 75
Discharge: HOME | End: 2020-08-03
Attending: EMERGENCY MEDICINE
Payer: MEDICARE

## 2020-08-03 VITALS
SYSTOLIC BLOOD PRESSURE: 150 MMHG | HEART RATE: 90 BPM | TEMPERATURE: 98 F | RESPIRATION RATE: 18 BRPM | OXYGEN SATURATION: 96 % | DIASTOLIC BLOOD PRESSURE: 63 MMHG

## 2020-08-03 DIAGNOSIS — K92.1 MELENA: ICD-10-CM

## 2020-08-03 DIAGNOSIS — K62.5 RECTAL BLEEDING: Primary | ICD-10-CM

## 2020-08-03 LAB
ABO + RH BLD: NORMAL
ALBUMIN SERPL BCP-MCNC: 4 G/DL (ref 3.5–5.2)
ALP SERPL-CCNC: 90 U/L (ref 55–135)
ALT SERPL W/O P-5'-P-CCNC: 22 U/L (ref 10–44)
ANION GAP SERPL CALC-SCNC: 10 MMOL/L (ref 8–16)
AST SERPL-CCNC: 27 U/L (ref 10–40)
BASOPHILS # BLD AUTO: 0.07 K/UL (ref 0–0.2)
BASOPHILS NFR BLD: 0.6 % (ref 0–1.9)
BILIRUB SERPL-MCNC: 0.5 MG/DL (ref 0.1–1)
BLD GP AB SCN CELLS X3 SERPL QL: NORMAL
BUN SERPL-MCNC: 21 MG/DL (ref 8–23)
CALCIUM SERPL-MCNC: 9.5 MG/DL (ref 8.7–10.5)
CHLORIDE SERPL-SCNC: 107 MMOL/L (ref 95–110)
CO2 SERPL-SCNC: 23 MMOL/L (ref 23–29)
CREAT SERPL-MCNC: 0.8 MG/DL (ref 0.5–1.4)
DIFFERENTIAL METHOD: ABNORMAL
EOSINOPHIL # BLD AUTO: 0.3 K/UL (ref 0–0.5)
EOSINOPHIL NFR BLD: 2.8 % (ref 0–8)
ERYTHROCYTE [DISTWIDTH] IN BLOOD BY AUTOMATED COUNT: 12.6 % (ref 11.5–14.5)
EST. GFR  (AFRICAN AMERICAN): >60 ML/MIN/1.73 M^2
EST. GFR  (NON AFRICAN AMERICAN): >60 ML/MIN/1.73 M^2
GLUCOSE SERPL-MCNC: 97 MG/DL (ref 70–110)
HCT VFR BLD AUTO: 43.3 % (ref 37–48.5)
HGB BLD-MCNC: 15.4 G/DL (ref 12–16)
IMM GRANULOCYTES # BLD AUTO: 0.04 K/UL (ref 0–0.04)
IMM GRANULOCYTES NFR BLD AUTO: 0.3 % (ref 0–0.5)
INR PPP: 1.1 (ref 0.8–1.2)
LIPASE SERPL-CCNC: 22 U/L (ref 4–60)
LYMPHOCYTES # BLD AUTO: 2.7 K/UL (ref 1–4.8)
LYMPHOCYTES NFR BLD: 22.7 % (ref 18–48)
MCH RBC QN AUTO: 31.7 PG (ref 27–31)
MCHC RBC AUTO-ENTMCNC: 35.6 G/DL (ref 32–36)
MCV RBC AUTO: 89 FL (ref 82–98)
MONOCYTES # BLD AUTO: 0.9 K/UL (ref 0.3–1)
MONOCYTES NFR BLD: 7.7 % (ref 4–15)
NEUTROPHILS # BLD AUTO: 7.9 K/UL (ref 1.8–7.7)
NEUTROPHILS NFR BLD: 65.9 % (ref 38–73)
NRBC BLD-RTO: 0 /100 WBC
OB PNL STL: POSITIVE
PLATELET # BLD AUTO: 197 K/UL (ref 150–350)
PMV BLD AUTO: 10.4 FL (ref 9.2–12.9)
POTASSIUM SERPL-SCNC: 3.7 MMOL/L (ref 3.5–5.1)
PROT SERPL-MCNC: 7.6 G/DL (ref 6–8.4)
PROTHROMBIN TIME: 11.2 SEC (ref 9–12.5)
RBC # BLD AUTO: 4.86 M/UL (ref 4–5.4)
SODIUM SERPL-SCNC: 140 MMOL/L (ref 136–145)
TROPONIN I SERPL DL<=0.01 NG/ML-MCNC: 0.01 NG/ML (ref 0–0.03)
WBC # BLD AUTO: 12 K/UL (ref 3.9–12.7)

## 2020-08-03 PROCEDURE — 99285 EMERGENCY DEPT VISIT HI MDM: CPT | Mod: 25

## 2020-08-03 PROCEDURE — 84484 ASSAY OF TROPONIN QUANT: CPT

## 2020-08-03 PROCEDURE — 82272 OCCULT BLD FECES 1-3 TESTS: CPT

## 2020-08-03 PROCEDURE — 25500020 PHARM REV CODE 255: Performed by: PHYSICIAN ASSISTANT

## 2020-08-03 PROCEDURE — 63600175 PHARM REV CODE 636 W HCPCS: Performed by: PHYSICIAN ASSISTANT

## 2020-08-03 PROCEDURE — 85610 PROTHROMBIN TIME: CPT

## 2020-08-03 PROCEDURE — 96361 HYDRATE IV INFUSION ADD-ON: CPT

## 2020-08-03 PROCEDURE — C9113 INJ PANTOPRAZOLE SODIUM, VIA: HCPCS | Performed by: PHYSICIAN ASSISTANT

## 2020-08-03 PROCEDURE — 93005 ELECTROCARDIOGRAM TRACING: CPT

## 2020-08-03 PROCEDURE — 80053 COMPREHEN METABOLIC PANEL: CPT

## 2020-08-03 PROCEDURE — 86850 RBC ANTIBODY SCREEN: CPT

## 2020-08-03 PROCEDURE — 85025 COMPLETE CBC W/AUTO DIFF WBC: CPT

## 2020-08-03 PROCEDURE — 96374 THER/PROPH/DIAG INJ IV PUSH: CPT

## 2020-08-03 PROCEDURE — 25000003 PHARM REV CODE 250: Performed by: PHYSICIAN ASSISTANT

## 2020-08-03 PROCEDURE — 96375 TX/PRO/DX INJ NEW DRUG ADDON: CPT

## 2020-08-03 PROCEDURE — 83690 ASSAY OF LIPASE: CPT

## 2020-08-03 RX ORDER — ONDANSETRON 2 MG/ML
4 INJECTION INTRAMUSCULAR; INTRAVENOUS
Status: COMPLETED | OUTPATIENT
Start: 2020-08-03 | End: 2020-08-03

## 2020-08-03 RX ORDER — DICYCLOMINE HYDROCHLORIDE 20 MG/1
20 TABLET ORAL 2 TIMES DAILY
Qty: 30 TABLET | Refills: 0 | Status: SHIPPED | OUTPATIENT
Start: 2020-08-03

## 2020-08-03 RX ORDER — PANTOPRAZOLE SODIUM 40 MG/10ML
40 INJECTION, POWDER, LYOPHILIZED, FOR SOLUTION INTRAVENOUS
Status: COMPLETED | OUTPATIENT
Start: 2020-08-03 | End: 2020-08-03

## 2020-08-03 RX ADMIN — ONDANSETRON HYDROCHLORIDE 4 MG: 2 SOLUTION INTRAMUSCULAR; INTRAVENOUS at 07:08

## 2020-08-03 RX ADMIN — IOHEXOL 75 ML: 350 INJECTION, SOLUTION INTRAVENOUS at 07:08

## 2020-08-03 RX ADMIN — LIDOCAINE HYDROCHLORIDE: 20 SOLUTION ORAL; TOPICAL at 08:08

## 2020-08-03 RX ADMIN — SODIUM CHLORIDE 1000 ML: 0.9 INJECTION, SOLUTION INTRAVENOUS at 07:08

## 2020-08-03 RX ADMIN — PANTOPRAZOLE SODIUM 40 MG: 40 INJECTION, POWDER, FOR SOLUTION INTRAVENOUS at 07:08

## 2020-08-03 NOTE — PROVIDER PROGRESS NOTES - EMERGENCY DEPT.
Emergency Department TeleTRIAGE Encounter Note      CHIEF COMPLAINT    Chief Complaint   Patient presents with    Rectal Bleeding     pt reports loose stools x 10 days, abd pain, tolerating po, pt reports today dark tarry stool, previous GI bleed last year        VITAL SIGNS   Initial Vitals [08/03/20 1617]   BP Pulse Resp Temp SpO2   (!) 144/68 100 17 97.9 °F (36.6 °C) 96 %      MAP       --            ALLERGIES    Review of patient's allergies indicates:   Allergen Reactions    Codeine        PROVIDER TRIAGE NOTE  This is a teletriage evaluation of a 74 y.o. female presenting to the ED with c/o mid abdominal pain with loose stools over the last 10 days.  Reports very dark tar colored stools over the last several days.  History of GI bleed in the past. Initial orders will be placed and care will be transferred to an alternate provider when patient is roomed for a full evaluation. Any additional orders and the final disposition will be determined by that provider.         ORDERS  Labs Reviewed   CBC W/ AUTO DIFFERENTIAL   COMPREHENSIVE METABOLIC PANEL   PROTIME-INR   TYPE & SCREEN       ED Orders (720h ago, onward)    Start Ordered     Status Ordering Provider    08/03/20 1623 08/03/20 1623  Saline lock IV (18 ga. or larger)  Once      Ordered ACOSTA SIFUENTES    08/03/20 1623 08/03/20 1623  2nd Saline lock IV (18 ga. or larger)  Once      Ordered ACOSTA SIFUENTES    08/03/20 1623 08/03/20 1623  NPO (Ice Chips)  Once      Ordered ACOSTA SIFUENTES    08/03/20 1623 08/03/20 1623  CBC auto differential  STAT  Collect    Ordered ACOSTA SIFUENTES    08/03/20 1623 08/03/20 1623  Comprehensive metabolic panel  STAT  Collect    Ordered ACOSTA SIFUENTES    08/03/20 1623 08/03/20 1623  Protime-INR  STAT  Collect    Ordered ACOSTA SIFUENTES    08/03/20 1623 08/03/20 1623  Type & Screen  STAT      Ordered ACOSTA SIFUENTES    08/03/20 1623 08/03/20 1623  Vital signs  Once      Ordered ACOSTA SIFUENTES    08/03/20 1623  08/03/20 1623  Cardiac Monitoring - Adult  Continuous     Comments: Notify Physician If:    Ordered ACOSTA SIFUENTES    08/03/20 1623 08/03/20 1623  Pulse Oximetry Continuous  Continuous      Ordered ACOSTA SIFUENTES            Virtual Visit Note: The provider triage portion of this emergency department evaluation and documentation was performed via [employer], a HIPAA-compliant telemedicine application, in concert with a tele-presenter in the room. A face to face patient evaluation with one of my colleagues will occur once the patient is placed in an emergency department room.      DISCLAIMER: This note was prepared with [email or web address] voice recognition transcription software. Garbled syntax, mangled pronouns, and other bizarre constructions may be attributed to that software system.

## 2020-08-04 NOTE — ED PROVIDER NOTES
Encounter Date: 8/3/2020       History     Chief Complaint   Patient presents with    Rectal Bleeding     pt reports loose stools x 10 days, abd pain, tolerating po, pt reports today dark tarry stool, previous GI bleed last year      Saima Dunn, a 74 y.o. female  has a past medical history of COPD (chronic obstructive pulmonary disease), Hepatitis C, Hyperlipidemia, and Hypertension.     She presents to the ED evaluation of diarrhea times 10 days with burning epigastric abdominal pain.  Patient states that she has been trying Pepto-Bismol and Mylanta at home with little improvement.  Denies any recent travel or antibiotic use.  States that she has dark stools.  Denies any previous history of abdominal surgeries.  Denies fever at home.      The history is provided by the patient.     Review of patient's allergies indicates:   Allergen Reactions    Codeine      Past Medical History:   Diagnosis Date    COPD (chronic obstructive pulmonary disease)     Hepatitis C     Hyperlipidemia     Hypertension      Past Surgical History:   Procedure Laterality Date    CHOLECYSTECTOMY      COLONOSCOPY N/A 12/7/2016    Procedure: COLONOSCOPY miralax split dose prep;  Surgeon: Zhang Jorge Jr., MD;  Location: [facility];  Service: Endoscopy;  Laterality: N/A;    ENDOSCOPIC ULTRASOUND OF UPPER GASTROINTESTINAL TRACT N/A 2/28/2019    Procedure: ULTRASOUND, UPPER GI TRACT, ENDOSCOPIC;  Surgeon: Danny Greco MD;  Location: [facility];  Service: Endoscopy;  Laterality: N/A;    ENDOSCOPIC ULTRASOUND OF UPPER GASTROINTESTINAL TRACT N/A 4/17/2019    Procedure: ULTRASOUND, UPPER GI TRACT, ENDOSCOPIC;  Surgeon: Danny Greco MD;  Location: [facility];  Service: Endoscopy;  Laterality: N/A;    ESOPHAGOGASTRODUODENOSCOPY N/A 2/27/2019    Procedure: ESOPHAGOGASTRODUODENOSCOPY (EGD);  Surgeon: Magui Bradley MD;  Location: [facility];  Service: Endoscopy;  Laterality: N/A;    ESOPHAGOGASTRODUODENOSCOPY N/A  4/17/2019    Procedure: EGD (ESOPHAGOGASTRODUODENOSCOPY);  Surgeon: Danny Greco MD;  Location[facility];  Service: Endoscopy;  Laterality: N/A;    HYSTERECTOMY  1967     Family History   Problem Relation Age of Onset    Ovarian cancer Mother     Ovarian cancer Sister      Social History     Tobacco Use    Smoking status: Current Every Day Smoker     Packs/day: 0.25    Smokeless tobacco: Never Used    Tobacco comment: Enrolled in [employer]   Substance Use Topics    Alcohol use: Never     Frequency: Never    Drug use: Not on file     Review of Systems   Constitutional: Negative for fever.   Respiratory: Negative for shortness of breath.    Cardiovascular: Negative for chest pain.   Gastrointestinal: Positive for abdominal pain, blood in stool and diarrhea. Negative for nausea and vomiting.   Allergic/Immunologic: Negative for immunocompromised state.   Hematological: Negative for adenopathy.   All other systems reviewed and are negative.      Physical Exam     Initial Vitals [08/03/20 1617]   BP Pulse Resp Temp SpO2   (!) 144/68 100 17 97.9 °F (36.6 °C) 96 %      MAP       --         Physical Exam    Nursing note and vitals reviewed.  Constitutional: She appears well-developed and well-nourished. She is not diaphoretic. No distress.   HENT:   Head: Normocephalic and atraumatic.   Right Ear: External ear normal.   Left Ear: External ear normal.   Nose: Nose normal.   Eyes: Conjunctivae and EOM are normal.   Neck: Normal range of motion.   Cardiovascular: Normal rate and regular rhythm.   Pulmonary/Chest: Breath sounds normal. No respiratory distress. She has no wheezes. She has no rhonchi. She has no rales. She exhibits no tenderness.   Abdominal: Soft. Bowel sounds are normal. She exhibits no distension. There is abdominal tenderness in the epigastric area. There is no rebound and no guarding.   Musculoskeletal: Normal range of motion.   Neurological: She is alert and oriented to person,  place, and time.   Skin: Skin is warm and dry. Capillary refill takes less than 2 seconds. No rash noted. No erythema.   Psychiatric: She has a normal mood and affect. Thought content normal.         ED Course   Procedures  Labs Reviewed   CBC W/ AUTO DIFFERENTIAL - Abnormal; Notable for the following components:       Result Value    Mean Corpuscular Hemoglobin 31.7 (*)     Gran # (ANC) 7.9 (*)     All other components within normal limits   OCCULT BLOOD X 1, STOOL - Abnormal; Notable for the following components:    Occult Blood Positive (*)     All other components within normal limits   COMPREHENSIVE METABOLIC PANEL   PROTIME-INR   LIPASE   TROPONIN I   TROPONIN I   LIPASE   TYPE & SCREEN          Imaging Results          CT Abdomen Pelvis With Contrast (Final result)  Result time 08/03/20 20:01:46    Final result by Jann Arango MD (08/03/20 20:01:46)                 Impression:      1. No acute intra-abdominal abnormalities identified.  2. Multiple nonobstructing right renal stones.  3. Postsurgical changes of cholecystectomy and hysterectomy.  4. Additional findings as detailed above.      Electronically signed by: Jann Arango MD  Date:    08/03/2020  Time:    20:01             Narrative:    EXAMINATION:  CT ABDOMEN PELVIS WITH CONTRAST    CLINICAL HISTORY:  LUQ abdominal pain;    TECHNIQUE:  Low dose axial images, sagittal and coronal reformations were obtained from the lung bases to the pubic symphysis following the IV administration of 75 mL of Omnipaque 350 .  Oral contrast was not given.    COMPARISON:  None.    FINDINGS:  The visualized portion of the heart is unremarkable.  The lung bases are clear.    No significant hepatic abnormalities are identified.  There is no intra-or extrahepatic biliary ductal dilatation.  Gallbladder has been removed.  The stomach, pancreas, spleen, and adrenal glands are unremarkable.    Kidneys enhance normally with no evidence of hydronephrosis.  Right renal cysts  are visualized.  Multiple nonobstructing right renal stones are also seen.  No abnormalities are seen along the ureteral courses.  Urinary bladder is unremarkable.  Uterus has been removed.    Appendix is not definitely visualized, however no abnormalities are seen in the region.  The visualized loops of small and large bowel show no evidence of obstruction or inflammation.  There is mild sigmoid diverticulosis.  No free air or free fluid.    Aorta tapers normally with mild to moderate atherosclerosis.    No acute osseous abnormality identified.  Mild degenerative changes are seen with moderate lower lumbar facet arthropathy.  Subcutaneous soft tissue show no significant abnormalities.                                 Medical Decision Making:   Initial Assessment:   Diarrhea, epigastric abdominal pain, melena  Differential Diagnosis:   Lower GI bleed, electrolyte abnormality, anemia  ED Management:  Patient presents to the ER for evaluation of epigastric abdominal pain with melena.  Tenderness to palpation in epigastric region with no evidence of peritoneal signs.  Patient was given GI cocktail, fluids and Protonix with improvement of her symptoms.  No concerning abnormalities on CBC, CMP, lipase, CT of abdomen.  Occult blood was positive however patient is hemodynamically stable.  She was given prescription for Bentyl and strict return precautions.  Encouraged to follow up with GI specialist or to return with any new or worsening symptoms.  Patient verbalized understanding and agreement with plan.                                 Clinical Impression:       ICD-10-CM ICD-9-CM   1. Rectal bleeding  K62.5 569.3   2. Melena  K92.1 578.1